# Patient Record
Sex: FEMALE | Race: WHITE | NOT HISPANIC OR LATINO | URBAN - METROPOLITAN AREA
[De-identification: names, ages, dates, MRNs, and addresses within clinical notes are randomized per-mention and may not be internally consistent; named-entity substitution may affect disease eponyms.]

---

## 2017-01-09 ENCOUNTER — EMERGENCY (EMERGENCY)
Facility: HOSPITAL | Age: 22
LOS: 1 days | Discharge: HOME | End: 2017-01-09
Attending: EMERGENCY MEDICINE | Admitting: EMERGENCY MEDICINE
Payer: COMMERCIAL

## 2017-01-09 VITALS
DIASTOLIC BLOOD PRESSURE: 65 MMHG | HEART RATE: 102 BPM | SYSTOLIC BLOOD PRESSURE: 118 MMHG | WEIGHT: 115.08 LBS | RESPIRATION RATE: 16 BRPM | OXYGEN SATURATION: 99 % | TEMPERATURE: 98 F

## 2017-01-09 DIAGNOSIS — F17.210 NICOTINE DEPENDENCE, CIGARETTES, UNCOMPLICATED: ICD-10-CM

## 2017-01-09 DIAGNOSIS — N83.201 UNSPECIFIED OVARIAN CYST, RIGHT SIDE: ICD-10-CM

## 2017-01-09 DIAGNOSIS — R10.30 LOWER ABDOMINAL PAIN, UNSPECIFIED: ICD-10-CM

## 2017-01-09 DIAGNOSIS — Z79.2 LONG TERM (CURRENT) USE OF ANTIBIOTICS: ICD-10-CM

## 2017-01-09 DIAGNOSIS — Z87.440 PERSONAL HISTORY OF URINARY (TRACT) INFECTIONS: ICD-10-CM

## 2017-01-09 LAB
ALBUMIN SERPL ELPH-MCNC: 4 G/DL — SIGNIFICANT CHANGE UP (ref 3.3–5)
ALP SERPL-CCNC: 57 U/L — SIGNIFICANT CHANGE UP (ref 40–120)
ALT FLD-CCNC: 21 U/L — SIGNIFICANT CHANGE UP (ref 12–78)
AMYLASE P1 CFR SERPL: 34 U/L — SIGNIFICANT CHANGE UP (ref 25–115)
ANION GAP SERPL CALC-SCNC: 7 MMOL/L — SIGNIFICANT CHANGE UP (ref 5–17)
APPEARANCE UR: CLEAR — SIGNIFICANT CHANGE UP
APTT BLD: 24.2 SEC — LOW (ref 27.5–37.4)
AST SERPL-CCNC: 30 U/L — SIGNIFICANT CHANGE UP (ref 15–37)
BASOPHILS # BLD AUTO: 0.1 K/UL — SIGNIFICANT CHANGE UP (ref 0–0.2)
BASOPHILS NFR BLD AUTO: 0.4 % — SIGNIFICANT CHANGE UP (ref 0–2)
BILIRUB SERPL-MCNC: 0.6 MG/DL — SIGNIFICANT CHANGE UP (ref 0.2–1.2)
BILIRUB UR-MCNC: NEGATIVE — SIGNIFICANT CHANGE UP
BUN SERPL-MCNC: 7 MG/DL — SIGNIFICANT CHANGE UP (ref 7–23)
CALCIUM SERPL-MCNC: 7.9 MG/DL — LOW (ref 8.5–10.1)
CHLORIDE SERPL-SCNC: 107 MMOL/L — SIGNIFICANT CHANGE UP (ref 96–108)
CO2 SERPL-SCNC: 28 MMOL/L — SIGNIFICANT CHANGE UP (ref 22–31)
COLOR SPEC: YELLOW — SIGNIFICANT CHANGE UP
CREAT SERPL-MCNC: 0.67 MG/DL — SIGNIFICANT CHANGE UP (ref 0.5–1.3)
DIFF PNL FLD: NEGATIVE — SIGNIFICANT CHANGE UP
EOSINOPHIL # BLD AUTO: 0 K/UL — SIGNIFICANT CHANGE UP (ref 0–0.5)
EOSINOPHIL NFR BLD AUTO: 0 % — SIGNIFICANT CHANGE UP (ref 0–6)
GLUCOSE SERPL-MCNC: 94 MG/DL — SIGNIFICANT CHANGE UP (ref 70–99)
GLUCOSE UR QL: NEGATIVE — SIGNIFICANT CHANGE UP
HCG SERPL-ACNC: <1 MIU/ML — SIGNIFICANT CHANGE UP
HCT VFR BLD CALC: 38.9 % — SIGNIFICANT CHANGE UP (ref 34.5–45)
HGB BLD-MCNC: 12.9 G/DL — SIGNIFICANT CHANGE UP (ref 11.5–15.5)
INR BLD: 1.02 RATIO — SIGNIFICANT CHANGE UP (ref 0.88–1.16)
KETONES UR-MCNC: NEGATIVE — SIGNIFICANT CHANGE UP
LEUKOCYTE ESTERASE UR-ACNC: NEGATIVE — SIGNIFICANT CHANGE UP
LIDOCAIN IGE QN: 94 U/L — SIGNIFICANT CHANGE UP (ref 73–393)
LYMPHOCYTES # BLD AUTO: 1.5 K/UL — SIGNIFICANT CHANGE UP (ref 1–3.3)
LYMPHOCYTES # BLD AUTO: 9.2 % — LOW (ref 13–44)
MCHC RBC-ENTMCNC: 30.8 PG — SIGNIFICANT CHANGE UP (ref 27–34)
MCHC RBC-ENTMCNC: 33.1 GM/DL — SIGNIFICANT CHANGE UP (ref 32–36)
MCV RBC AUTO: 93 FL — SIGNIFICANT CHANGE UP (ref 80–100)
MONOCYTES # BLD AUTO: 1.1 K/UL — HIGH (ref 0–0.9)
MONOCYTES NFR BLD AUTO: 6.8 % — SIGNIFICANT CHANGE UP (ref 1–9)
NEUTROPHILS # BLD AUTO: 13.3 K/UL — HIGH (ref 1.8–7.4)
NEUTROPHILS NFR BLD AUTO: 83.5 % — HIGH (ref 43–77)
NITRITE UR-MCNC: NEGATIVE — SIGNIFICANT CHANGE UP
PH UR: 6.5 — SIGNIFICANT CHANGE UP (ref 4.8–8)
PLATELET # BLD AUTO: 275 K/UL — SIGNIFICANT CHANGE UP (ref 150–400)
POTASSIUM SERPL-MCNC: 3.9 MMOL/L — SIGNIFICANT CHANGE UP (ref 3.5–5.3)
POTASSIUM SERPL-SCNC: 3.9 MMOL/L — SIGNIFICANT CHANGE UP (ref 3.5–5.3)
PROT SERPL-MCNC: 7.1 G/DL — SIGNIFICANT CHANGE UP (ref 6–8.3)
PROT UR-MCNC: NEGATIVE — SIGNIFICANT CHANGE UP
PROTHROM AB SERPL-ACNC: 11.3 SEC — SIGNIFICANT CHANGE UP (ref 10–13.1)
RBC # BLD: 4.19 M/UL — SIGNIFICANT CHANGE UP (ref 3.8–5.2)
RBC # FLD: 15.6 % — HIGH (ref 10.3–14.5)
SODIUM SERPL-SCNC: 142 MMOL/L — SIGNIFICANT CHANGE UP (ref 135–145)
SP GR SPEC: 1.01 — SIGNIFICANT CHANGE UP (ref 1.01–1.02)
UROBILINOGEN FLD QL: NEGATIVE — SIGNIFICANT CHANGE UP
WBC # BLD: 15.9 K/UL — HIGH (ref 3.8–10.5)
WBC # FLD AUTO: 15.9 K/UL — HIGH (ref 3.8–10.5)

## 2017-01-09 PROCEDURE — 99285 EMERGENCY DEPT VISIT HI MDM: CPT

## 2017-01-09 RX ORDER — MORPHINE SULFATE 50 MG/1
4 CAPSULE, EXTENDED RELEASE ORAL ONCE
Qty: 0 | Refills: 0 | Status: DISCONTINUED | OUTPATIENT
Start: 2017-01-09 | End: 2017-01-09

## 2017-01-09 RX ORDER — KETOROLAC TROMETHAMINE 30 MG/ML
30 SYRINGE (ML) INJECTION ONCE
Qty: 0 | Refills: 0 | Status: DISCONTINUED | OUTPATIENT
Start: 2017-01-09 | End: 2017-01-09

## 2017-01-09 RX ORDER — IOHEXOL 300 MG/ML
30 INJECTION, SOLUTION INTRAVENOUS ONCE
Qty: 0 | Refills: 0 | Status: DISCONTINUED | OUTPATIENT
Start: 2017-01-09 | End: 2017-01-13

## 2017-01-09 RX ORDER — ONDANSETRON 8 MG/1
4 TABLET, FILM COATED ORAL ONCE
Qty: 0 | Refills: 0 | Status: COMPLETED | OUTPATIENT
Start: 2017-01-09 | End: 2017-01-09

## 2017-01-09 RX ORDER — SODIUM CHLORIDE 9 MG/ML
3 INJECTION INTRAMUSCULAR; INTRAVENOUS; SUBCUTANEOUS ONCE
Qty: 0 | Refills: 0 | Status: COMPLETED | OUTPATIENT
Start: 2017-01-09 | End: 2017-01-09

## 2017-01-09 RX ORDER — SODIUM CHLORIDE 9 MG/ML
1000 INJECTION INTRAMUSCULAR; INTRAVENOUS; SUBCUTANEOUS
Qty: 0 | Refills: 0 | Status: DISCONTINUED | OUTPATIENT
Start: 2017-01-09 | End: 2017-01-13

## 2017-01-09 RX ADMIN — SODIUM CHLORIDE 1000 MILLILITER(S): 9 INJECTION INTRAMUSCULAR; INTRAVENOUS; SUBCUTANEOUS at 23:18

## 2017-01-09 RX ADMIN — Medication 30 MILLIGRAM(S): at 23:18

## 2017-01-09 RX ADMIN — SODIUM CHLORIDE 1000 MILLILITER(S): 9 INJECTION INTRAMUSCULAR; INTRAVENOUS; SUBCUTANEOUS at 21:54

## 2017-01-09 RX ADMIN — ONDANSETRON 4 MILLIGRAM(S): 8 TABLET, FILM COATED ORAL at 21:53

## 2017-01-09 RX ADMIN — MORPHINE SULFATE 4 MILLIGRAM(S): 50 CAPSULE, EXTENDED RELEASE ORAL at 21:53

## 2017-01-09 RX ADMIN — SODIUM CHLORIDE 3 MILLILITER(S): 9 INJECTION INTRAMUSCULAR; INTRAVENOUS; SUBCUTANEOUS at 21:53

## 2017-01-09 NOTE — ED PROVIDER NOTE - GASTROINTESTINAL, MLM
Abdomen soft, non-distended. no guarding. Pos diffuse lower abd tend, RLQ>LLQ, pos diffuse pelvic tend. No CVAT

## 2017-01-09 NOTE — ED PROVIDER NOTE - OBJECTIVE STATEMENT
20 yo F pw lower abd pain x past few hours. Mod pain. No n/v/d. Pt with recent diag of UTI, was on abx, last dose last night. No cp/sob/palp. no fever, slight chills tonight. No cp./sob/palp. no VB / vag dc. No neck / back pain. No hematuria. No agg/allev factors. No other inj or co. pt denies possibility of pregnancy.

## 2017-01-09 NOTE — ED PROVIDER NOTE - ENMT, MLM
Airway patent, Nasal mucosa clear. Mouth with normal mucosa. Throat has no vesicles, no oropharyngeal exudates and uvula is midline. MM Moist. Non-toxic, well appearing.

## 2017-01-09 NOTE — ED ADULT NURSE NOTE - OBJECTIVE STATEMENT
pt states she developed lower abdominal pain bilaterally at 2030. pt states she is nauseous but has not vomited or had diarrhea. pt in bed with mom at bedside crying in pain.

## 2017-01-09 NOTE — ED PROVIDER NOTE - CHPI ED SYMPTOMS NEG
no vomiting/no constipation/no hematuria/no decreased eating/drinking/no dysuria/no blood in stool/no abdominal distension/no weakness/no fever/no melena/no diarrhea

## 2017-01-10 PROCEDURE — 85730 THROMBOPLASTIN TIME PARTIAL: CPT

## 2017-01-10 PROCEDURE — 96374 THER/PROPH/DIAG INJ IV PUSH: CPT | Mod: 59

## 2017-01-10 PROCEDURE — 81003 URINALYSIS AUTO W/O SCOPE: CPT

## 2017-01-10 PROCEDURE — 99284 EMERGENCY DEPT VISIT MOD MDM: CPT | Mod: 25

## 2017-01-10 PROCEDURE — 74177 CT ABD & PELVIS W/CONTRAST: CPT | Mod: 26

## 2017-01-10 PROCEDURE — 83690 ASSAY OF LIPASE: CPT

## 2017-01-10 PROCEDURE — 76830 TRANSVAGINAL US NON-OB: CPT

## 2017-01-10 PROCEDURE — 76856 US EXAM PELVIC COMPLETE: CPT

## 2017-01-10 PROCEDURE — 76830 TRANSVAGINAL US NON-OB: CPT | Mod: 26

## 2017-01-10 PROCEDURE — 96375 TX/PRO/DX INJ NEW DRUG ADDON: CPT

## 2017-01-10 PROCEDURE — 80053 COMPREHEN METABOLIC PANEL: CPT

## 2017-01-10 PROCEDURE — 87086 URINE CULTURE/COLONY COUNT: CPT

## 2017-01-10 PROCEDURE — 84702 CHORIONIC GONADOTROPIN TEST: CPT

## 2017-01-10 PROCEDURE — 74177 CT ABD & PELVIS W/CONTRAST: CPT

## 2017-01-10 PROCEDURE — 76856 US EXAM PELVIC COMPLETE: CPT | Mod: 26

## 2017-01-10 PROCEDURE — 82150 ASSAY OF AMYLASE: CPT

## 2017-01-10 PROCEDURE — 85027 COMPLETE CBC AUTOMATED: CPT

## 2017-01-10 PROCEDURE — 85610 PROTHROMBIN TIME: CPT

## 2017-01-11 LAB
CULTURE RESULTS: NO GROWTH — SIGNIFICANT CHANGE UP
SPECIMEN SOURCE: SIGNIFICANT CHANGE UP

## 2017-02-15 ENCOUNTER — APPOINTMENT (OUTPATIENT)
Dept: OBGYN | Facility: CLINIC | Age: 22
End: 2017-02-15

## 2017-02-15 VITALS
SYSTOLIC BLOOD PRESSURE: 116 MMHG | WEIGHT: 110 LBS | HEIGHT: 62 IN | DIASTOLIC BLOOD PRESSURE: 75 MMHG | BODY MASS INDEX: 20.24 KG/M2

## 2017-02-15 DIAGNOSIS — Z30.09 ENCOUNTER FOR OTHER GENERAL COUNSELING AND ADVICE ON CONTRACEPTION: ICD-10-CM

## 2017-02-15 RX ORDER — ETONOGESTREL AND ETHINYL ESTRADIOL .12; .015 MG/D; MG/D
0.12-0.015 INSERT, EXTENDED RELEASE VAGINAL
Qty: 1 | Refills: 11 | Status: ACTIVE | COMMUNITY
Start: 2017-02-15 | End: 1900-01-01

## 2017-03-29 ENCOUNTER — APPOINTMENT (OUTPATIENT)
Dept: OBGYN | Facility: CLINIC | Age: 22
End: 2017-03-29

## 2017-10-02 ENCOUNTER — TRANSCRIPTION ENCOUNTER (OUTPATIENT)
Age: 22
End: 2017-10-02

## 2017-12-01 ENCOUNTER — EMERGENCY (EMERGENCY)
Facility: HOSPITAL | Age: 22
LOS: 1 days | Discharge: HOME | End: 2017-12-01
Attending: EMERGENCY MEDICINE | Admitting: EMERGENCY MEDICINE
Payer: COMMERCIAL

## 2017-12-01 VITALS
HEART RATE: 68 BPM | TEMPERATURE: 98 F | DIASTOLIC BLOOD PRESSURE: 84 MMHG | RESPIRATION RATE: 18 BRPM | OXYGEN SATURATION: 98 % | SYSTOLIC BLOOD PRESSURE: 118 MMHG

## 2017-12-01 VITALS
HEART RATE: 81 BPM | OXYGEN SATURATION: 100 % | SYSTOLIC BLOOD PRESSURE: 118 MMHG | DIASTOLIC BLOOD PRESSURE: 77 MMHG | RESPIRATION RATE: 16 BRPM

## 2017-12-01 PROCEDURE — 99282 EMERGENCY DEPT VISIT SF MDM: CPT

## 2017-12-01 PROCEDURE — 99284 EMERGENCY DEPT VISIT MOD MDM: CPT

## 2017-12-01 NOTE — ED PROVIDER NOTE - CHPI ED SYMPTOMS NEG
no pain/no tingling/no vomiting/no fever/no chills/no decreased eating/drinking/no weakness/no dizziness

## 2017-12-01 NOTE — ED PROVIDER NOTE - OBJECTIVE STATEMENT
22 year old female with hx of drug abuse/dependence other than that no significant pmhx presents with request to detox from opoid use. Patient sniffs and takes Xanax and opioids for about 5 years. Last time use was Wednesday. Patient states mild shakiness and headaches. Admits to smoking marijuana upon arrival to ED. Tobacco use and social ETOH use. 22 year old female with hx of drug abuse/dependence other than that no significant pmhx presents with request to detox from opoid use. Patient sniffs and takes Xanax and opioids for about 5 years. Last time use was Wednesday. Patient states mild shakiness and headaches. Admits to smoking marijuana upon arrival to ED. Tobacco use and social ETOH use. Denies any medical complaints. No vomiting, abdominal pain, palpitations

## 2017-12-01 NOTE — ED PROVIDER NOTE - PROGRESS NOTE DETAILS
Spoke with social work and cannot provide detox. However will be providing resources for OP rehab. Plan for DC Spoke with social work and cannot provide transfer for detox due to . However will be providing resources for OP rehab. Plan for DC Spoke with social work and cannot provide transfer for detox due to not actively withdrawing. However will be providing resources for OP rehab. Plan for DC

## 2017-12-01 NOTE — ED PROVIDER NOTE - NS_ ATTENDINGSCRIBEDETAILS _ED_A_ED_FT
Maddi Carlson MD - Attending Physician: The scribe's documentation has been prepared under my direction and personally reviewed by me in its entirety. I confirm that the note above accurately reflects all work, treatment, procedures, and medical decision making performed by me.

## 2017-12-01 NOTE — ED ADULT NURSE NOTE - OBJECTIVE STATEMENT
22y female pt, hx of drug abuse, arrived to ED requesting detox from opioids use. Pt states that she used Opioids including Xanax for about 5years and last use was this Wednesday night. Pt arrived with c/o of headache and mild shaking, follows commands, slow speech noted but coherent, calm and cooperative while talking. Pt also states that she smoked marijuana prior to arrival to ED and dose use tobacco. Denies SI/HI. No other complaints noted.

## 2017-12-02 PROBLEM — N39.0 URINARY TRACT INFECTION, SITE NOT SPECIFIED: Chronic | Status: ACTIVE | Noted: 2017-01-09

## 2018-09-21 ENCOUNTER — EMERGENCY (EMERGENCY)
Facility: HOSPITAL | Age: 23
LOS: 1 days | Discharge: HOME | End: 2018-09-21
Attending: EMERGENCY MEDICINE
Payer: COMMERCIAL

## 2018-09-21 VITALS
RESPIRATION RATE: 14 BRPM | HEART RATE: 112 BPM | HEIGHT: 60 IN | SYSTOLIC BLOOD PRESSURE: 95 MMHG | OXYGEN SATURATION: 98 % | TEMPERATURE: 99 F | DIASTOLIC BLOOD PRESSURE: 62 MMHG | WEIGHT: 139.99 LBS

## 2018-09-21 LAB
ALBUMIN SERPL ELPH-MCNC: 4.7 G/DL — SIGNIFICANT CHANGE UP (ref 3.3–5)
ALP SERPL-CCNC: 59 U/L — SIGNIFICANT CHANGE UP (ref 40–120)
ALT FLD-CCNC: 29 U/L — SIGNIFICANT CHANGE UP (ref 10–45)
ANION GAP SERPL CALC-SCNC: 15 MMOL/L — SIGNIFICANT CHANGE UP (ref 5–17)
AST SERPL-CCNC: 22 U/L — SIGNIFICANT CHANGE UP (ref 10–40)
BASOPHILS # BLD AUTO: 0 K/UL — SIGNIFICANT CHANGE UP (ref 0–0.2)
BASOPHILS NFR BLD AUTO: 0.3 % — SIGNIFICANT CHANGE UP (ref 0–2)
BILIRUB SERPL-MCNC: 0.6 MG/DL — SIGNIFICANT CHANGE UP (ref 0.2–1.2)
BUN SERPL-MCNC: 15 MG/DL — SIGNIFICANT CHANGE UP (ref 7–23)
CALCIUM SERPL-MCNC: 9.5 MG/DL — SIGNIFICANT CHANGE UP (ref 8.4–10.5)
CHLORIDE SERPL-SCNC: 102 MMOL/L — SIGNIFICANT CHANGE UP (ref 96–108)
CO2 SERPL-SCNC: 22 MMOL/L — SIGNIFICANT CHANGE UP (ref 22–31)
CREAT SERPL-MCNC: 0.98 MG/DL — SIGNIFICANT CHANGE UP (ref 0.5–1.3)
EOSINOPHIL # BLD AUTO: 0 K/UL — SIGNIFICANT CHANGE UP (ref 0–0.5)
EOSINOPHIL NFR BLD AUTO: 0.4 % — SIGNIFICANT CHANGE UP (ref 0–6)
GLUCOSE SERPL-MCNC: 88 MG/DL — SIGNIFICANT CHANGE UP (ref 70–99)
HCT VFR BLD CALC: 43.8 % — SIGNIFICANT CHANGE UP (ref 34.5–45)
HGB BLD-MCNC: 14.8 G/DL — SIGNIFICANT CHANGE UP (ref 11.5–15.5)
LYMPHOCYTES # BLD AUTO: 1.1 K/UL — SIGNIFICANT CHANGE UP (ref 1–3.3)
LYMPHOCYTES # BLD AUTO: 13.6 % — SIGNIFICANT CHANGE UP (ref 13–44)
MCHC RBC-ENTMCNC: 31.4 PG — SIGNIFICANT CHANGE UP (ref 27–34)
MCHC RBC-ENTMCNC: 33.8 GM/DL — SIGNIFICANT CHANGE UP (ref 32–36)
MCV RBC AUTO: 92.8 FL — SIGNIFICANT CHANGE UP (ref 80–100)
MONOCYTES # BLD AUTO: 0.4 K/UL — SIGNIFICANT CHANGE UP (ref 0–0.9)
MONOCYTES NFR BLD AUTO: 5.3 % — SIGNIFICANT CHANGE UP (ref 2–14)
NEUTROPHILS # BLD AUTO: 6.7 K/UL — SIGNIFICANT CHANGE UP (ref 1.8–7.4)
NEUTROPHILS NFR BLD AUTO: 80.3 % — HIGH (ref 43–77)
PLATELET # BLD AUTO: 329 K/UL — SIGNIFICANT CHANGE UP (ref 150–400)
POTASSIUM SERPL-MCNC: 4 MMOL/L — SIGNIFICANT CHANGE UP (ref 3.5–5.3)
POTASSIUM SERPL-SCNC: 4 MMOL/L — SIGNIFICANT CHANGE UP (ref 3.5–5.3)
PROT SERPL-MCNC: 7.7 G/DL — SIGNIFICANT CHANGE UP (ref 6–8.3)
RBC # BLD: 4.71 M/UL — SIGNIFICANT CHANGE UP (ref 3.8–5.2)
RBC # FLD: 14.2 % — SIGNIFICANT CHANGE UP (ref 10.3–14.5)
SODIUM SERPL-SCNC: 139 MMOL/L — SIGNIFICANT CHANGE UP (ref 135–145)
WBC # BLD: 8.3 K/UL — SIGNIFICANT CHANGE UP (ref 3.8–10.5)
WBC # FLD AUTO: 8.3 K/UL — SIGNIFICANT CHANGE UP (ref 3.8–10.5)

## 2018-09-21 PROCEDURE — 99284 EMERGENCY DEPT VISIT MOD MDM: CPT | Mod: 25

## 2018-09-21 PROCEDURE — 93010 ELECTROCARDIOGRAM REPORT: CPT | Mod: NC

## 2018-09-21 PROCEDURE — 90792 PSYCH DIAG EVAL W/MED SRVCS: CPT

## 2018-09-21 RX ORDER — SODIUM CHLORIDE 9 MG/ML
1000 INJECTION INTRAMUSCULAR; INTRAVENOUS; SUBCUTANEOUS ONCE
Qty: 0 | Refills: 0 | Status: COMPLETED | OUTPATIENT
Start: 2018-09-21 | End: 2018-09-21

## 2018-09-21 RX ADMIN — SODIUM CHLORIDE 1000 MILLILITER(S): 9 INJECTION INTRAMUSCULAR; INTRAVENOUS; SUBCUTANEOUS at 23:27

## 2018-09-21 NOTE — ED PROVIDER NOTE - SHIFT CHANGE DETAILS
***ATTENDING ADDENDUM (Dr. Dutch Saba): I have received handoff from [place]. s/p possible intentional over-administration of heroin v. therapeutic misadventure. Serial reassessments. Consider psychiatry consultation r/o suicidal ideation? Will continue to observe and monitor closely.

## 2018-09-21 NOTE — ED ADULT NURSE NOTE - NSIMPLEMENTINTERV_GEN_ALL_ED
Implemented All Fall Risk Interventions:  [place] to call system. Call bell, personal items and telephone within reach. Instruct patient to call for assistance. Room bathroom lighting operational. Non-slip footwear when patient is off stretcher. Physically safe environment: no spills, clutter or unnecessary equipment. Stretcher in lowest position, wheels locked, appropriate side rails in place. Provide visual cue, wrist band, yellow gown, etc. Monitor gait and stability. Monitor for mental status changes and reorient to person, place, and time. Review medications for side effects contributing to fall risk. Reinforce activity limits and safety measures with patient and family.

## 2018-09-21 NOTE — ED PROVIDER NOTE - PROGRESS NOTE DETAILS
**ATTENDING ADDENDUM (Dr. Dutch Saba): patient serially evaluated throughout ED course. NO acute deterioration up to this time in the ED. Awaiting completion of all ED diagnostics. Will continue to observe and monitor closely. Psychiatry to evaluate at patient's mother's request. Agree with goals/plan of ED care as described in EMR, including diagnostics, therapeutics and consultation as clinically warranted. per mom pt has been endorsing SI/HI and wants psych consult. psych consulted. **ATTENDING ADDENDUM (Dr. Dutch Saba): patient serially evaluated throughout ED course by ED team. NO acute deterioration up to this time in the ED. Agree with goals/plan of ED care as described in EMR, including diagnostics, therapeutics and consultation with psychiatry. Awaiting psychiatry consultation. In addition, prescription for naloxone nasal spray sent via e-prescribe to [facility]. HANDOFF made to [facility] at this time. (1) followup psychiatry consultation (2) perform serial reassessments (3) disposition pending at this time. Pyie: Pt's alert and awake. Psy cleared her for d/c. Pt's treated for UTI in ED. Mother is a SW and has a plan for outpatient detox.

## 2018-09-21 NOTE — ED PROVIDER NOTE - NS ED ROS FT
CONSTITUTIONAL: No fevers, no chills  Eyes: no visual changes  Ears:  no ear pain  Nose: no nasal congestion  Mouth/Throat: no sore throat  Cardiovascular: No Chest pain  Respiratory: No SOB  Gastrointestinal: No n/v/d, no abd pain  Genitourinary: no dysuria, no hematuria  SKIN: no rashes.  NEURO: no headache

## 2018-09-21 NOTE — ED PROVIDER NOTE - MEDICAL DECISION MAKING DETAILS
Attending Alex Bowser DO: 21 yo female hx of polysubstance abuse, "thrown out of rehab" 2 weeks ago for noncompliance with curfew, as per uncle has been injecting heroin since she left rehab Attending Alex Bowser DO: 23 yo female hx of polysubstance abuse, "thrown out of rehab" 2 weeks ago for noncompliance with curfew, as per uncle has been injecting heroin since she left rehab presents after co ingestion of heroin and xanax 1 hr ago. Unknown amount, but patient states "her usual amount". Unclear intent but concerns from family of possible SI attempt. PE: Ill appearing, sleepy, NAD, opens eyes to voice, lungs clear, RRR, no difficulty breathing, ab soft nt/nd, no rashes. A/P: Pt with likely intoxication from combination opiate and benzo. Pt protecting airway and with sufficient ventilation/oxygenation. Will check labs, tox screen, fluids, ekg, place on end tidal CO2 monitoring, will give family narcan for home, and observation until sober and then discuss with psych regarding possible SI attempt.
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
## 2018-09-21 NOTE — ED ADULT NURSE NOTE - OBJECTIVE STATEMENT
Pt presents to the ED with complaint of overdose, pt drowsy and lethargic, answers question with one word answers, pt uncle brought patient in to ED as pt was found at home lethargic and "speaking gibberish", per uncle pt was taking xanax and heroin throughout the day, pt speaking coherently on arrival, breathing unlabored and shallow, lung sounds clear, skins color pale for age and race, pt denies pain at this time, no nonverbal indicator of pain present, pt denies suicidal ideation,

## 2018-09-21 NOTE — ED PROVIDER NOTE - OBJECTIVE STATEMENT
22 YOF BIB uncle s/p heroin use and xanax use. Pt has hx of polysubstance abuse recently in rehab discharged 2 weeks ago. Pt has been using regularly since discharge. Pt states she had 5 pills of xanax today and injected heroin around 9pm. Pt feels slightly sleepy has no complaints. Per uncle, pt seemed slightly out of it and he found 2 used needles so he brought her into the emergency department. Pt denies any SI/HI.

## 2018-09-21 NOTE — ED PROVIDER NOTE - CARE PLAN
Principal Discharge DX:	Overdose Principal Discharge DX:	Benzodiazepine overdose of undetermined intent, initial encounter  Secondary Diagnosis:	Opioid dependence with uncomplicated intoxication

## 2018-09-21 NOTE — ED PROVIDER NOTE - CONSTITUTIONAL, MLM
normal... Well appearing, well nourished, awake, follows commands, oriented to person, place, time/situation and in no apparent distress.

## 2018-09-22 VITALS
HEART RATE: 76 BPM | RESPIRATION RATE: 16 BRPM | SYSTOLIC BLOOD PRESSURE: 105 MMHG | DIASTOLIC BLOOD PRESSURE: 65 MMHG | TEMPERATURE: 98 F

## 2018-09-22 DIAGNOSIS — F11.20 OPIOID DEPENDENCE, UNCOMPLICATED: ICD-10-CM

## 2018-09-22 PROBLEM — F19.20 OTHER PSYCHOACTIVE SUBSTANCE DEPENDENCE, UNCOMPLICATED: Chronic | Status: ACTIVE | Noted: 2017-12-01

## 2018-09-22 LAB
APAP SERPL-MCNC: <15 UG/ML — SIGNIFICANT CHANGE UP (ref 10–30)
APPEARANCE UR: ABNORMAL
BACTERIA # UR AUTO: ABNORMAL
BILIRUB UR-MCNC: NEGATIVE — SIGNIFICANT CHANGE UP
COLOR SPEC: YELLOW — SIGNIFICANT CHANGE UP
DIFF PNL FLD: NEGATIVE — SIGNIFICANT CHANGE UP
EPI CELLS # UR: 9 /HPF — HIGH
ETHANOL SERPL-MCNC: SIGNIFICANT CHANGE UP MG/DL (ref 0–10)
GLUCOSE UR QL: NEGATIVE — SIGNIFICANT CHANGE UP
HCG UR QL: NEGATIVE — SIGNIFICANT CHANGE UP
HYALINE CASTS # UR AUTO: 5 /LPF — HIGH (ref 0–2)
KETONES UR-MCNC: ABNORMAL
LEUKOCYTE ESTERASE UR-ACNC: ABNORMAL
NITRITE UR-MCNC: POSITIVE
PCP SPEC-MCNC: SIGNIFICANT CHANGE UP
PH UR: 6.5 — SIGNIFICANT CHANGE UP (ref 5–8)
PROT UR-MCNC: ABNORMAL
RBC CASTS # UR COMP ASSIST: 2 /HPF — SIGNIFICANT CHANGE UP (ref 0–4)
SALICYLATES SERPL-MCNC: <2 MG/DL — LOW (ref 15–30)
SP GR SPEC: 1.03 — SIGNIFICANT CHANGE UP
UROBILINOGEN FLD QL: NEGATIVE — SIGNIFICANT CHANGE UP
WBC UR QL: 66 /HPF — HIGH (ref 0–5)

## 2018-09-22 PROCEDURE — 87186 SC STD MICRODIL/AGAR DIL: CPT

## 2018-09-22 PROCEDURE — 85027 COMPLETE CBC AUTOMATED: CPT

## 2018-09-22 PROCEDURE — 80307 DRUG TEST PRSMV CHEM ANLYZR: CPT

## 2018-09-22 PROCEDURE — 81025 URINE PREGNANCY TEST: CPT

## 2018-09-22 PROCEDURE — 99285 EMERGENCY DEPT VISIT HI MDM: CPT | Mod: 25

## 2018-09-22 PROCEDURE — 93005 ELECTROCARDIOGRAM TRACING: CPT

## 2018-09-22 PROCEDURE — 87086 URINE CULTURE/COLONY COUNT: CPT

## 2018-09-22 PROCEDURE — 80053 COMPREHEN METABOLIC PANEL: CPT

## 2018-09-22 PROCEDURE — 81001 URINALYSIS AUTO W/SCOPE: CPT

## 2018-09-22 PROCEDURE — 96365 THER/PROPH/DIAG IV INF INIT: CPT

## 2018-09-22 RX ORDER — NALOXONE HYDROCHLORIDE 4 MG/.1ML
4 SPRAY NASAL
Qty: 4 | Refills: 0
Start: 2018-09-22 | End: 2018-09-22

## 2018-09-22 RX ORDER — CEPHALEXIN 500 MG
1 CAPSULE ORAL
Qty: 20 | Refills: 0
Start: 2018-09-22 | End: 2018-10-01

## 2018-09-22 RX ORDER — CEFTRIAXONE 500 MG/1
1 INJECTION, POWDER, FOR SOLUTION INTRAMUSCULAR; INTRAVENOUS ONCE
Qty: 0 | Refills: 0 | Status: COMPLETED | OUTPATIENT
Start: 2018-09-22 | End: 2018-09-22

## 2018-09-22 RX ORDER — SODIUM CHLORIDE 9 MG/ML
1000 INJECTION INTRAMUSCULAR; INTRAVENOUS; SUBCUTANEOUS ONCE
Qty: 0 | Refills: 0 | Status: COMPLETED | OUTPATIENT
Start: 2018-09-22 | End: 2018-09-22

## 2018-09-22 RX ADMIN — CEFTRIAXONE 1 GRAM(S): 500 INJECTION, POWDER, FOR SOLUTION INTRAMUSCULAR; INTRAVENOUS at 13:29

## 2018-09-22 RX ADMIN — SODIUM CHLORIDE 1000 MILLILITER(S): 9 INJECTION INTRAMUSCULAR; INTRAVENOUS; SUBCUTANEOUS at 09:00

## 2018-09-22 RX ADMIN — CEFTRIAXONE 100 GRAM(S): 500 INJECTION, POWDER, FOR SOLUTION INTRAMUSCULAR; INTRAVENOUS at 12:55

## 2018-09-22 RX ADMIN — SODIUM CHLORIDE 2000 MILLILITER(S): 9 INJECTION INTRAMUSCULAR; INTRAVENOUS; SUBCUTANEOUS at 08:30

## 2018-09-22 NOTE — ED BEHAVIORAL HEALTH ASSESSMENT NOTE - DIFFERENTIAL
Substance Use disorder  Substance induced MDD  Major Depressive Disorder  Generalized Anxiety Disorder  Borderline Personality Disorder  Bipolar Disorder with psychotic features Substance Use disorder  Substance induced mood disorder   Major Depressive Disorder  Generalized Anxiety Disorder  Borderline Personality Disorder  opiate dependence   Bipolar Disorder with psychotic features

## 2018-09-22 NOTE — ED ADULT NURSE REASSESSMENT NOTE - NS ED NURSE REASSESS COMMENT FT1
Per pt mother speaking with MD Garcia, pt has endorsed suicidal ideation in the past. Pt denied SI on arrival to ED. Pt placed on one to one observation for risk of harm to self. Pt lethargic and resting in stretcher with mother at bedside. Pt on continuous pulse oximetry and capnography. Pt provided food and soft drink, GOLD Schmitt spoke with mother regarding situation. Comfort and safety maintained.

## 2018-09-22 NOTE — ED ADULT NURSE REASSESSMENT NOTE - NS ED NURSE REASSESS COMMENT FT1
Pt lethargic and drowsy, answers questions with one word answers. Unable to maintain eye opening, mother at bedside, pt on continuous pulse oximeter and capnography.

## 2018-09-22 NOTE — ED BEHAVIORAL HEALTH ASSESSMENT NOTE - CASE SUMMARY
22 YOF BIB uncle, hx polysubtance abuse, mostly heroin, recent rehab admissions, no prior psych admissions, not in current psych tx, presented to ER for overdose of heroin, xanax, consulted for possible suicide attempt. Pt poor historian initially, was lethargic in the am, Pt now more alert. Pt denies si/hi. Pt denied si at the time of overdosing on heroin, repeatedly stated "I just want to get high". Pt usually takes 2-3 bag daily, injecting heroin. pt states she took only 1 xanax pill last night. pt denies depression, anxiety, psychosis, livia. Family concerned pt may have manic symptoms few weeks ago, pt has been more depressed, increasing amts of heroin recently. Pt has appt for [facility] next monday. pt doesn't warrant invol psych admission at this time. no 1:1 needed. pt overall low risk for suicide attempt. 22 YOF BIB uncle, hx polysubstance abuse, mostly heroin, recent rehab admissions, no prior psych admissions, not in current psych tx, presented to ER for overdose of heroin, xanax, consulted for possible suicide attempt. Pt poor historian initially, was lethargic in the am, Pt now more alert. Pt denies si/hi. Pt denied si at the time of overdosing on heroin, repeatedly stated "I just want to get high". Pt usually takes 2-3 bag daily, injecting heroin. pt states she took only 1 xanax pill last night. pt denies depression, anxiety, psychosis, livia. Family concerned pt may have manic symptoms few weeks ago, pt has been more depressed, increasing amts of heroin recently. Pt has appt for [facility] detox/[facility]hab next monday. pt doesn't warrant invol psych admission at this time. no 1:1 needed. pt overall low risk for suicide attempt.
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
## 2018-09-22 NOTE — ED BEHAVIORAL HEALTH ASSESSMENT NOTE - OTHER PAST PSYCHIATRIC HISTORY (INCLUDE DETAILS REGARDING ONSET, COURSE OF ILLNESS, INPATIENT/OUTPATIENT TREATMENT)
Substance use disorder for many years, periods of livia followed by depression and occasional psychosis. remote dx of depression and anxiety.
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
## 2018-09-22 NOTE — ED BEHAVIORAL HEALTH ASSESSMENT NOTE - RISK ASSESSMENT
Patient is an acute risk to herself. Although she denies active suicidality, she is certainly engaging in very risky behavior with a high likely pedro of serious life consequences including death. Patient has also stated to sister a few days ago, a passive wish for dying and this happened with an overdose being acceptable to her. The fact that currently she is hospitalized for an OD also puts her in an acute risk category. pt overall low risk for suicide attempt, no current si/hi, no hx attempts, future oriented, risk factors include substance abuse, hx cutting
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
## 2018-09-22 NOTE — CHART NOTE - NSCHARTNOTEFT_GEN_A_CORE
ED : Patient referred to SW via MD referral as patient presents to ED s/p overdose. As per H&P, “22 YOF BIB uncle s/p heroin use and xanax use. Pt has hx of polysubstance abuse recently in rehab discharged 2 weeks ago. Pt has been using regularly since discharge. Pt states she had 5 pills of xanax today and injected heroin around 9pm. Pt feels slightly sleepy has no complaints. Per uncle, pt seemed slightly out of it and he found 2 used needles so he brought her into the emergency department.” As per psychiatry, patient does not need inpatient psychiatric admission at this time. LMSW met with patient at bedside to explore substance abuse resources with patient. Patient very lethargic, and no family present at bedside. As per patient she has an appointment Monday morning for detox/inpatient at [facility] on Monday. Patient has been inpatient [facility] twice and has not had success. LMSW provided patient with alternate substance abuse resources to look into. Patient stated she will have her mother look over the list. As per psychiatry and medical team, patient medically stable for discharge home. Patient mother to assist patient home. LMSW assured ongoing role and availability. Social work to remain available as needed.
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
## 2018-09-22 NOTE — CONSULT NOTE ADULT - ASSESSMENT
Patient is a 22 year old female with an extensive psychiatric history. Given her long history of drug use, it is very difficult to gauge how much of the current psychiatric issues are substance induced and how much is baseline psychiatric ilness. Patient may benefit from impatient psychiatric admission for sobering up and then being evaluated for psychiatric illness. Although most likely, she will require a long-term rehab placement for extensive substance use disorder.

## 2018-09-22 NOTE — ED BEHAVIORAL HEALTH ASSESSMENT NOTE - SUICIDE RISK FACTORS
Mood episode/Access to means (pills, firearms, etc.)/Unable to engage in safety planning/History of abuse/trauma/Substance abuse/dependence

## 2018-09-22 NOTE — ED BEHAVIORAL HEALTH ASSESSMENT NOTE - SUMMARY
Patient is a 22 year old female with an extensive psychiatric history. Given her long history of drug use, it is very difficult to gauge how much of the current psychiatric issues are substance induced and how much is baseline psychiatric illness. Patient certainly has many chaotic life events that have happened and enough collateral information to show she is not able to navigate through life and deal with everyday life stressors. Conversation with patient when she is more awake is warranted. Patient is a 22 year old female with an extensive psychiatric history. Given her long history of drug use, it is very difficult to gauge how much of the current psychiatric issues are substance induced and how much is baseline psychiatric illness. Patient certainly has many chaotic life events that have happened and enough collateral information to show she is not able to navigate through life and deal with everyday life.

## 2018-09-22 NOTE — ED BEHAVIORAL HEALTH ASSESSMENT NOTE - OTHER
twin sister lives with boyfriend and family lethargic gets agitated and answers impulsively laying in bed

## 2018-12-06 ENCOUNTER — APPOINTMENT (OUTPATIENT)
Dept: OBGYN | Facility: CLINIC | Age: 23
End: 2018-12-06

## 2019-08-01 ENCOUNTER — RESULT REVIEW (OUTPATIENT)
Age: 24
End: 2019-08-01

## 2021-08-26 NOTE — ED ADULT NURSE NOTE - DISCHARGE DATE/TIME
01-Dec-2017 17:55 O-T Advancement Flap Text: The defect edges were debeveled with a #15 scalpel blade.  Given the location of the defect, shape of the defect and the proximity to free margins an O-T advancement flap was deemed most appropriate.  Using a sterile surgical marker, an appropriate advancement flap was drawn incorporating the defect and placing the expected incisions within the relaxed skin tension lines where possible.    The area thus outlined was incised deep to adipose tissue with a #15 scalpel blade.  The skin margins were undermined to an appropriate distance in all directions utilizing iris scissors.

## 2021-09-22 NOTE — ED PROVIDER NOTE - MEDICAL DECISION MAKING DETAILS
Yes they are wanting hospice now.   22 year old female wanting detox from opioids and benzo. Reports mild jitteriness. Medically well. Plan: will discuss with social work for dispo plan.

## 2021-10-02 ENCOUNTER — EMERGENCY (EMERGENCY)
Facility: HOSPITAL | Age: 26
LOS: 1 days | Discharge: HOME | End: 2021-10-02
Attending: EMERGENCY MEDICINE | Admitting: EMERGENCY MEDICINE
Payer: MEDICAID

## 2021-10-02 VITALS
HEART RATE: 78 BPM | OXYGEN SATURATION: 97 % | DIASTOLIC BLOOD PRESSURE: 57 MMHG | HEIGHT: 60 IN | RESPIRATION RATE: 18 BRPM | SYSTOLIC BLOOD PRESSURE: 98 MMHG | TEMPERATURE: 98 F

## 2021-10-02 PROCEDURE — 99283 EMERGENCY DEPT VISIT LOW MDM: CPT

## 2021-10-02 RX ORDER — METHADONE HYDROCHLORIDE 40 MG/1
35 TABLET ORAL ONCE
Refills: 0 | Status: DISCONTINUED | OUTPATIENT
Start: 2021-10-02 | End: 2021-10-02

## 2021-10-02 RX ADMIN — METHADONE HYDROCHLORIDE 35 MILLIGRAM(S): 40 TABLET ORAL at 11:38

## 2021-10-02 NOTE — ED ADULT TRIAGE NOTE - CHIEF COMPLAINT QUOTE
Pt states she's a new patient at the methadone clinic, didn't realize it was closed today, so she was told to come to ED to get her dose. Denies any complaints.

## 2021-10-02 NOTE — ED PROVIDER NOTE - NS ED ROS FT
GENERAL: No fever, chills  CARDIAC: no chest pain/pressure, SOB, lower extremity swelling  PULMONARY: no cough, SOB  GI: no abdominal pain, n/v/d  : no dysuria, no hematuria  SKIN: no rashes, no ecchymosis  NEURO: no headache, lightheadedness

## 2021-10-02 NOTE — ED PROVIDER NOTE - PHYSICAL EXAMINATION
GEN: Patient awake alert NAD.   HEENT: normocephalic, atraumatic.  CARDIAC: RRR, S1, S2, no murmur.   PULM: CTA B/L no wheeze, rhonchi, rales.   NEURO: A&Ox3,

## 2021-10-02 NOTE — ED PROVIDER NOTE - CLINICAL SUMMARY MEDICAL DECISION MAKING FREE TEXT BOX
24 yo F presenting for methadone dose. Pt is a pt of the [facility] Methadone clinic for the past two wks.  Dose confirmed to be 35mg. Will administer.

## 2021-10-02 NOTE — ED PROVIDER NOTE - ATTENDING CONTRIBUTION TO CARE
DR. NUÑEZ, ATTENDING MD-  I performed a face to face bedside interview with the patient regarding history of present illness, review of symptoms and past medical history. I completed an independent physical exam.  I have discussed the patient's plan of care with the resident.   Documentation as above in the note.    24 y/o female missed methadone dose today at [facility] clinic as she got the hours wrong.  No w/d sx.  I spoke with pharmacy here who confirmed dose is 35mg and typically given another dose to go home with for tomorrow, however, we do not give methadone to go.  Will give dose today, pt to return tomorrow for Sunday's dose.

## 2021-10-02 NOTE — ED PROVIDER NOTE - PATIENT PORTAL LINK FT
You can access the FollowMyHealth Patient Portal offered by [facility] by registering at the following website: [email or web address]. By joining [employer]’s FollowMyHealth portal, you will also be able to view your health information using other applications (apps) compatible with our system.

## 2021-10-02 NOTE — ED PROVIDER NOTE - OBJECTIVE STATEMENT
26 yo F with Hx of substance abuse is presenting to the ED for her methadone dose. Pt missed her dose today because the center was closed. Her dose is 35mg.  Pt is a pt of the [facility] methadone clinic for the past two weeks. Pt denies any complaints.

## 2021-10-03 ENCOUNTER — EMERGENCY (EMERGENCY)
Facility: HOSPITAL | Age: 26
LOS: 1 days | Discharge: HOME | End: 2021-10-03
Attending: EMERGENCY MEDICINE | Admitting: EMERGENCY MEDICINE
Payer: COMMERCIAL

## 2021-10-03 VITALS
RESPIRATION RATE: 18 BRPM | DIASTOLIC BLOOD PRESSURE: 99 MMHG | HEIGHT: 60 IN | HEART RATE: 72 BPM | SYSTOLIC BLOOD PRESSURE: 118 MMHG | TEMPERATURE: 98 F | OXYGEN SATURATION: 99 %

## 2021-10-03 PROCEDURE — 99283 EMERGENCY DEPT VISIT LOW MDM: CPT

## 2021-10-03 RX ORDER — METHADONE HYDROCHLORIDE 40 MG/1
35 TABLET ORAL ONCE
Refills: 0 | Status: DISCONTINUED | OUTPATIENT
Start: 2021-10-03 | End: 2021-10-03

## 2021-10-03 RX ADMIN — METHADONE HYDROCHLORIDE 35 MILLIGRAM(S): 40 TABLET ORAL at 13:23

## 2021-10-03 NOTE — ED PROVIDER NOTE - PATIENT PORTAL LINK FT
You can access the FollowMyHealth Patient Portal offered by [facility] by registering at the following website: [email or web address]. By joining [employer]’s FollowMyHealth portal, you will also be able to view your health information using other applications (apps) compatible with our system.

## 2021-10-03 NOTE — ED PROVIDER NOTE - CLINICAL SUMMARY MEDICAL DECISION MAKING FREE TEXT BOX
24 y/o female on methadone therapy here for usual dose.  I saw the pt yesterday and confirmed dose with pharmacy at that time.  Will order methadone, dc with clinic f/u.

## 2021-10-03 NOTE — ED PROVIDER NOTE - OBJECTIVE STATEMENT
26 y/o female on methadone here for methadone dose.  Pt follows at [facility] methadone clinic.  Missed her appt yesterday and came to the ED yesterday and received usual 35 mg dose confirmed by pharmacy here.  She usually goes home with a dose on Saturdays as the clinic is closed Sundays.  Per pharmacy, cannot give dose to go home and advised she return Sunday for dose which she has done.  Feels at us[facility], no complaints.

## 2022-04-20 ENCOUNTER — INPATIENT (INPATIENT)
Facility: HOSPITAL | Age: 27
LOS: 0 days | Discharge: HOME | End: 2022-04-21
Attending: STUDENT IN AN ORGANIZED HEALTH CARE EDUCATION/TRAINING PROGRAM | Admitting: STUDENT IN AN ORGANIZED HEALTH CARE EDUCATION/TRAINING PROGRAM
Payer: MEDICAID

## 2022-04-20 VITALS
SYSTOLIC BLOOD PRESSURE: 113 MMHG | TEMPERATURE: 98 F | OXYGEN SATURATION: 98 % | RESPIRATION RATE: 18 BRPM | DIASTOLIC BLOOD PRESSURE: 77 MMHG | HEART RATE: 84 BPM | HEIGHT: 60 IN

## 2022-04-20 DIAGNOSIS — R10.9 UNSPECIFIED ABDOMINAL PAIN: ICD-10-CM

## 2022-04-20 DIAGNOSIS — Z29.9 ENCOUNTER FOR PROPHYLACTIC MEASURES, UNSPECIFIED: ICD-10-CM

## 2022-04-20 DIAGNOSIS — Z87.898 PERSONAL HISTORY OF OTHER SPECIFIED CONDITIONS: ICD-10-CM

## 2022-04-20 DIAGNOSIS — R11.10 VOMITING, UNSPECIFIED: ICD-10-CM

## 2022-04-20 DIAGNOSIS — F11.20 OPIOID DEPENDENCE, UNCOMPLICATED: ICD-10-CM

## 2022-04-20 DIAGNOSIS — F17.200 NICOTINE DEPENDENCE, UNSPECIFIED, UNCOMPLICATED: ICD-10-CM

## 2022-04-20 DIAGNOSIS — Z91.89 OTHER SPECIFIED PERSONAL RISK FACTORS, NOT ELSEWHERE CLASSIFIED: ICD-10-CM

## 2022-04-20 DIAGNOSIS — N30.90 CYSTITIS, UNSPECIFIED WITHOUT HEMATURIA: ICD-10-CM

## 2022-04-20 DIAGNOSIS — F12.188 CANNABIS ABUSE WITH OTHER CANNABIS-INDUCED DISORDER: ICD-10-CM

## 2022-04-20 DIAGNOSIS — F19.90 OTHER PSYCHOACTIVE SUBSTANCE USE, UNSPECIFIED, UNCOMPLICATED: ICD-10-CM

## 2022-04-20 LAB
ALBUMIN SERPL ELPH-MCNC: 4.8 G/DL — SIGNIFICANT CHANGE UP (ref 3.3–5)
ALP SERPL-CCNC: 60 U/L — SIGNIFICANT CHANGE UP (ref 40–120)
ALT FLD-CCNC: 16 U/L — SIGNIFICANT CHANGE UP (ref 4–33)
ANION GAP SERPL CALC-SCNC: 19 MMOL/L — HIGH (ref 7–14)
APPEARANCE UR: CLEAR — SIGNIFICANT CHANGE UP
AST SERPL-CCNC: 27 U/L — SIGNIFICANT CHANGE UP (ref 4–32)
BASOPHILS # BLD AUTO: 0.02 K/UL — SIGNIFICANT CHANGE UP (ref 0–0.2)
BASOPHILS NFR BLD AUTO: 0.2 % — SIGNIFICANT CHANGE UP (ref 0–2)
BILIRUB SERPL-MCNC: 0.4 MG/DL — SIGNIFICANT CHANGE UP (ref 0.2–1.2)
BILIRUB UR-MCNC: NEGATIVE — SIGNIFICANT CHANGE UP
BLOOD GAS VENOUS COMPREHENSIVE RESULT: SIGNIFICANT CHANGE UP
BUN SERPL-MCNC: 14 MG/DL — SIGNIFICANT CHANGE UP (ref 7–23)
CALCIUM SERPL-MCNC: 9.2 MG/DL — SIGNIFICANT CHANGE UP (ref 8.4–10.5)
CHLORIDE SERPL-SCNC: 100 MMOL/L — SIGNIFICANT CHANGE UP (ref 98–107)
CO2 SERPL-SCNC: 17 MMOL/L — LOW (ref 22–31)
COLOR SPEC: COLORLESS — SIGNIFICANT CHANGE UP
CREAT SERPL-MCNC: 0.74 MG/DL — SIGNIFICANT CHANGE UP (ref 0.5–1.3)
DIFF PNL FLD: NEGATIVE — SIGNIFICANT CHANGE UP
EGFR: 114 ML/MIN/1.73M2 — SIGNIFICANT CHANGE UP
EOSINOPHIL # BLD AUTO: 0.05 K/UL — SIGNIFICANT CHANGE UP (ref 0–0.5)
EOSINOPHIL NFR BLD AUTO: 0.5 % — SIGNIFICANT CHANGE UP (ref 0–6)
GLUCOSE SERPL-MCNC: 103 MG/DL — HIGH (ref 70–99)
GLUCOSE UR QL: NEGATIVE — SIGNIFICANT CHANGE UP
HCG SERPL-ACNC: <5 MIU/ML — SIGNIFICANT CHANGE UP
HCT VFR BLD CALC: 39.6 % — SIGNIFICANT CHANGE UP (ref 34.5–45)
HGB BLD-MCNC: 12.7 G/DL — SIGNIFICANT CHANGE UP (ref 11.5–15.5)
IANC: 8.03 K/UL — HIGH (ref 1.8–7.4)
IMM GRANULOCYTES NFR BLD AUTO: 0.3 % — SIGNIFICANT CHANGE UP (ref 0–1.5)
KETONES UR-MCNC: NEGATIVE — SIGNIFICANT CHANGE UP
LACTATE SERPL-SCNC: 1.2 MMOL/L — SIGNIFICANT CHANGE UP (ref 0.5–2)
LEUKOCYTE ESTERASE UR-ACNC: NEGATIVE — SIGNIFICANT CHANGE UP
LIDOCAIN IGE QN: 35 U/L — SIGNIFICANT CHANGE UP (ref 7–60)
LYMPHOCYTES # BLD AUTO: 2.2 K/UL — SIGNIFICANT CHANGE UP (ref 1–3.3)
LYMPHOCYTES # BLD AUTO: 20 % — SIGNIFICANT CHANGE UP (ref 13–44)
MCHC RBC-ENTMCNC: 30.6 PG — SIGNIFICANT CHANGE UP (ref 27–34)
MCHC RBC-ENTMCNC: 32.1 GM/DL — SIGNIFICANT CHANGE UP (ref 32–36)
MCV RBC AUTO: 95.4 FL — SIGNIFICANT CHANGE UP (ref 80–100)
MONOCYTES # BLD AUTO: 0.69 K/UL — SIGNIFICANT CHANGE UP (ref 0–0.9)
MONOCYTES NFR BLD AUTO: 6.3 % — SIGNIFICANT CHANGE UP (ref 2–14)
NEUTROPHILS # BLD AUTO: 8.03 K/UL — HIGH (ref 1.8–7.4)
NEUTROPHILS NFR BLD AUTO: 72.7 % — SIGNIFICANT CHANGE UP (ref 43–77)
NITRITE UR-MCNC: NEGATIVE — SIGNIFICANT CHANGE UP
NRBC # BLD: 0 /100 WBCS — SIGNIFICANT CHANGE UP
NRBC # FLD: 0 K/UL — SIGNIFICANT CHANGE UP
PH UR: 8.5 — HIGH (ref 5–8)
PLATELET # BLD AUTO: 270 K/UL — SIGNIFICANT CHANGE UP (ref 150–400)
POTASSIUM SERPL-MCNC: 4.8 MMOL/L — SIGNIFICANT CHANGE UP (ref 3.5–5.3)
POTASSIUM SERPL-SCNC: 4.8 MMOL/L — SIGNIFICANT CHANGE UP (ref 3.5–5.3)
PROT SERPL-MCNC: 7.4 G/DL — SIGNIFICANT CHANGE UP (ref 6–8.3)
PROT UR-MCNC: NEGATIVE — SIGNIFICANT CHANGE UP
RBC # BLD: 4.15 M/UL — SIGNIFICANT CHANGE UP (ref 3.8–5.2)
RBC # FLD: 16.4 % — HIGH (ref 10.3–14.5)
SARS-COV-2 RNA SPEC QL NAA+PROBE: SIGNIFICANT CHANGE UP
SODIUM SERPL-SCNC: 136 MMOL/L — SIGNIFICANT CHANGE UP (ref 135–145)
SP GR SPEC: 1.01 — SIGNIFICANT CHANGE UP (ref 1–1.05)
UROBILINOGEN FLD QL: SIGNIFICANT CHANGE UP
WBC # BLD: 11.02 K/UL — HIGH (ref 3.8–10.5)
WBC # FLD AUTO: 11.02 K/UL — HIGH (ref 3.8–10.5)

## 2022-04-20 PROCEDURE — 99252 IP/OBS CONSLTJ NEW/EST SF 35: CPT | Mod: GC

## 2022-04-20 PROCEDURE — 99285 EMERGENCY DEPT VISIT HI MDM: CPT

## 2022-04-20 PROCEDURE — 99231 SBSQ HOSP IP/OBS SF/LOW 25: CPT | Mod: GC

## 2022-04-20 PROCEDURE — 99233 SBSQ HOSP IP/OBS HIGH 50: CPT | Mod: GC

## 2022-04-20 PROCEDURE — 74177 CT ABD & PELVIS W/CONTRAST: CPT | Mod: 26,MA

## 2022-04-20 PROCEDURE — 99223 1ST HOSP IP/OBS HIGH 75: CPT | Mod: GC

## 2022-04-20 PROCEDURE — 99222 1ST HOSP IP/OBS MODERATE 55: CPT | Mod: GC

## 2022-04-20 RX ORDER — PANTOPRAZOLE SODIUM 20 MG/1
40 TABLET, DELAYED RELEASE ORAL
Refills: 0 | Status: DISCONTINUED | OUTPATIENT
Start: 2022-04-20 | End: 2022-04-21

## 2022-04-20 RX ORDER — ONDANSETRON 8 MG/1
4 TABLET, FILM COATED ORAL ONCE
Refills: 0 | Status: COMPLETED | OUTPATIENT
Start: 2022-04-20 | End: 2022-04-20

## 2022-04-20 RX ORDER — CITALOPRAM 10 MG/1
0 TABLET, FILM COATED ORAL
Qty: 0 | Refills: 0 | DISCHARGE

## 2022-04-20 RX ORDER — CITALOPRAM 10 MG/1
40 TABLET, FILM COATED ORAL DAILY
Refills: 0 | Status: DISCONTINUED | OUTPATIENT
Start: 2022-04-20 | End: 2022-04-21

## 2022-04-20 RX ORDER — METHADONE HYDROCHLORIDE 40 MG/1
40 TABLET ORAL ONCE
Refills: 0 | Status: DISCONTINUED | OUTPATIENT
Start: 2022-04-20 | End: 2022-04-20

## 2022-04-20 RX ORDER — ACETAMINOPHEN 500 MG
1000 TABLET ORAL ONCE
Refills: 0 | Status: COMPLETED | OUTPATIENT
Start: 2022-04-20 | End: 2022-04-20

## 2022-04-20 RX ORDER — QUETIAPINE FUMARATE 200 MG/1
0 TABLET, FILM COATED ORAL
Qty: 0 | Refills: 0 | DISCHARGE

## 2022-04-20 RX ORDER — SODIUM CHLORIDE 9 MG/ML
1000 INJECTION INTRAMUSCULAR; INTRAVENOUS; SUBCUTANEOUS ONCE
Refills: 0 | Status: COMPLETED | OUTPATIENT
Start: 2022-04-20 | End: 2022-04-20

## 2022-04-20 RX ORDER — HYDROXYZINE HCL 10 MG
50 TABLET ORAL AT BEDTIME
Refills: 0 | Status: DISCONTINUED | OUTPATIENT
Start: 2022-04-20 | End: 2022-04-21

## 2022-04-20 RX ORDER — CITALOPRAM 10 MG/1
1 TABLET, FILM COATED ORAL
Qty: 0 | Refills: 0 | DISCHARGE

## 2022-04-20 RX ORDER — PRAZOSIN HCL 2 MG
1 CAPSULE ORAL
Qty: 0 | Refills: 0 | DISCHARGE

## 2022-04-20 RX ORDER — METOCLOPRAMIDE HCL 10 MG
10 TABLET ORAL ONCE
Refills: 0 | Status: COMPLETED | OUTPATIENT
Start: 2022-04-20 | End: 2022-04-20

## 2022-04-20 RX ORDER — QUETIAPINE FUMARATE 200 MG/1
50 TABLET, FILM COATED ORAL AT BEDTIME
Refills: 0 | Status: DISCONTINUED | OUTPATIENT
Start: 2022-04-20 | End: 2022-04-21

## 2022-04-20 RX ORDER — NICOTINE POLACRILEX 2 MG
1 GUM BUCCAL DAILY
Refills: 0 | Status: DISCONTINUED | OUTPATIENT
Start: 2022-04-20 | End: 2022-04-21

## 2022-04-20 RX ORDER — QUETIAPINE FUMARATE 200 MG/1
1 TABLET, FILM COATED ORAL
Qty: 0 | Refills: 0 | DISCHARGE

## 2022-04-20 RX ORDER — FAMOTIDINE 10 MG/ML
20 INJECTION INTRAVENOUS ONCE
Refills: 0 | Status: COMPLETED | OUTPATIENT
Start: 2022-04-20 | End: 2022-04-20

## 2022-04-20 RX ORDER — MORPHINE SULFATE 50 MG/1
4 CAPSULE, EXTENDED RELEASE ORAL ONCE
Refills: 0 | Status: DISCONTINUED | OUTPATIENT
Start: 2022-04-20 | End: 2022-04-20

## 2022-04-20 RX ORDER — PHENAZOPYRIDINE HCL 100 MG
100 TABLET ORAL EVERY 8 HOURS
Refills: 0 | Status: DISCONTINUED | OUTPATIENT
Start: 2022-04-20 | End: 2022-04-21

## 2022-04-20 RX ORDER — METHADONE HYDROCHLORIDE 40 MG/1
85 TABLET ORAL DAILY
Refills: 0 | Status: DISCONTINUED | OUTPATIENT
Start: 2022-04-21 | End: 2022-04-21

## 2022-04-20 RX ORDER — HYDROXYZINE HCL 10 MG
1 TABLET ORAL
Qty: 0 | Refills: 0 | DISCHARGE

## 2022-04-20 RX ORDER — MAGNESIUM SULFATE 500 MG/ML
2 VIAL (ML) INJECTION ONCE
Refills: 0 | Status: COMPLETED | OUTPATIENT
Start: 2022-04-20 | End: 2022-04-20

## 2022-04-20 RX ORDER — DOXAZOSIN MESYLATE 4 MG
2 TABLET ORAL AT BEDTIME
Refills: 0 | Status: DISCONTINUED | OUTPATIENT
Start: 2022-04-20 | End: 2022-04-21

## 2022-04-20 RX ORDER — PRAZOSIN HCL 2 MG
0 CAPSULE ORAL
Qty: 0 | Refills: 0 | DISCHARGE

## 2022-04-20 RX ADMIN — QUETIAPINE FUMARATE 50 MILLIGRAM(S): 200 TABLET, FILM COATED ORAL at 22:31

## 2022-04-20 RX ADMIN — ONDANSETRON 4 MILLIGRAM(S): 8 TABLET, FILM COATED ORAL at 08:22

## 2022-04-20 RX ADMIN — PANTOPRAZOLE SODIUM 40 MILLIGRAM(S): 20 TABLET, DELAYED RELEASE ORAL at 18:27

## 2022-04-20 RX ADMIN — METHADONE HYDROCHLORIDE 40 MILLIGRAM(S): 40 TABLET ORAL at 19:53

## 2022-04-20 RX ADMIN — SODIUM CHLORIDE 1000 MILLILITER(S): 9 INJECTION INTRAMUSCULAR; INTRAVENOUS; SUBCUTANEOUS at 10:57

## 2022-04-20 RX ADMIN — Medication 10 MILLIGRAM(S): at 08:46

## 2022-04-20 RX ADMIN — Medication 2 MILLIGRAM(S): at 08:46

## 2022-04-20 RX ADMIN — Medication 1 PATCH: at 18:28

## 2022-04-20 RX ADMIN — CITALOPRAM 40 MILLIGRAM(S): 10 TABLET, FILM COATED ORAL at 22:31

## 2022-04-20 RX ADMIN — Medication 1 PATCH: at 19:34

## 2022-04-20 RX ADMIN — Medication 25 GRAM(S): at 14:37

## 2022-04-20 RX ADMIN — MORPHINE SULFATE 4 MILLIGRAM(S): 50 CAPSULE, EXTENDED RELEASE ORAL at 08:22

## 2022-04-20 RX ADMIN — Medication 100 MILLIGRAM(S): at 22:31

## 2022-04-20 RX ADMIN — Medication 1000 MILLIGRAM(S): at 11:07

## 2022-04-20 RX ADMIN — Medication 2 MILLIGRAM(S): at 22:30

## 2022-04-20 RX ADMIN — Medication 400 MILLIGRAM(S): at 10:52

## 2022-04-20 RX ADMIN — MORPHINE SULFATE 4 MILLIGRAM(S): 50 CAPSULE, EXTENDED RELEASE ORAL at 08:37

## 2022-04-20 RX ADMIN — Medication 1000 MILLIGRAM(S): at 11:52

## 2022-04-20 RX ADMIN — SODIUM CHLORIDE 1000 MILLILITER(S): 9 INJECTION INTRAMUSCULAR; INTRAVENOUS; SUBCUTANEOUS at 09:06

## 2022-04-20 RX ADMIN — SODIUM CHLORIDE 1000 MILLILITER(S): 9 INJECTION INTRAMUSCULAR; INTRAVENOUS; SUBCUTANEOUS at 08:22

## 2022-04-20 RX ADMIN — FAMOTIDINE 20 MILLIGRAM(S): 10 INJECTION INTRAVENOUS at 08:22

## 2022-04-20 NOTE — ED PROVIDER NOTE - CLINICAL SUMMARY MEDICAL DECISION MAKING FREE TEXT BOX
25 Y/O F H/O Peptic ulcer disease, Heroin, marijuana and benzodiazapine use currently on methadone presents with acute nausea and epigastric pain which began at 5AM. Pt reported to [facility] today and received her methadone but subsequently vomited. Plan is anti-emetics, pain control, IV hydration, Ct to eval for acute abdominal pathology, labs to eval for pancreatitis, anemia or electrolyte disturbance, reassessment.

## 2022-04-20 NOTE — H&P ADULT - ATTENDING COMMENTS
26 y.o. F with hx of PUD, anxiety, polysubstance abuse (heroin, marijuana, benzodiazepines) currently on methadone who presents with N/V and lower abdominal pain. Pt was unable to take PO yesterday, but tolerated solids in the ED.   Also, pt had a BM in the ED. She complains about not getting her methadone.     When I evaluated pt, she was sleeping comfortably. Upon waking up, she immediately complains of pain.     #Emesis and abdominal pain:  -NBNB  -ddx includes gastritis, viral enteritis or cannabinoid hyperemesis syndrome  -On chart review, there is an admission at OSH for very similar N/V symptoms in 2020; ;this repetitive pattern of admissions makes me suspect cannabinoid hyperemesis more  -now tolerating PO, will start regular diet  -senna/miralax in the setting of abdominal pain largely suspected to be in the setting of moderate stool burden on CT, likely in the setting of opioid use    #Cystitis:   -patient has radiographic evidence of cystitis, but denies symptoms  -states she had a UTI a month ago that has since been treated  -no indication for abx at this time

## 2022-04-20 NOTE — H&P ADULT - PROBLEM SELECTOR PLAN 3
- nicotine patch 85 mg PO methadone daily per patient    Plan  - f/up with methadone clinic for dosing 85 mg PO methadone daily per patient  CTA/P showed stool burden in rectum    Plan  - f/up with methadone clinic for dosing  - miralax and senna daily to help with bowel movements

## 2022-04-20 NOTE — H&P ADULT - PROBLEM SELECTOR PLAN 5
DVT ppx w/ ambulation  GI ppx w/ pantoprazole  Regular diet Cystitis seen on CT, patient has history of UTI tx w/ nitrofurantoin one month ago     Plan:  - no need to treat at this time  - CTM

## 2022-04-20 NOTE — ED ADULT NURSE NOTE - CHIEF COMPLAINT QUOTE
Pt BIBEMS from [facility] Methadone clinic for abd pain and nausea/vomiting this am. States vomited 7 times. Dry heaving in triage. Pmhx: Drug abuse, gastritis, ulcers

## 2022-04-20 NOTE — ED PROVIDER NOTE - NS ED ATTENDING STATEMENT MOD
This was a shared visit with the BOBO. I reviewed and verified the documentation and independently performed the documented:

## 2022-04-20 NOTE — ED PROVIDER NOTE - ATTENDING APP SHARED VISIT CONTRIBUTION OF CARE
Mendoza: I have seen and examined the patient face to face, have reviewed and addended the HPI, PE and a/p as necessary.     25 Y/O F H/O Peptic ulcer disease, Heroin, marijuana and benzodiazapine use currently on methadone presents with acute nausea and epigastric pain which began at 5AM. Pt reported to [facility] today and received her methadone but subsequently vomited. States she had an endoscopy at an OSH years ago and was admitted for 4 days for peptic ulcer disease. Pt states the pain is currently 9/10. Pt denies any other sx or acute complaints.    25 yo F with peptic ulcer disease, heroin, marijuana and benzodiazepine use, currently on methadone a/w acute nausea and epigastric pain.  Pain started this AM, patient went to [facility] for methadone and continued to have nausea and vomiting.  Pt actively wretching upon arrival and during evaluation.  Reports pain is 9/10.  No fever/chills, No photophobia/eye pain/changes in vision, No ear pain/sore throat/dysphagia, No chest pain/palpitations, no SOB/cough/wheeze/stridor, no dysuria/frequency/discharge, No neck/back pain, no rash, no changes in neurological status/function.     GEN - uncomofrtable appearing actively vomiting,   CARD -s1s2, RRR, no M,G,R;   PULM - CTA b/l, symmetric breath sounds;   ABD -  +BS, diffusely tender to palpation, soft, no guarding, no rebound, no masses;   BACK - no CVA tenderness, Normal  spine;   EXT - symmetric pulses, 2+ dp, capillary refill < 2 seconds, no cyanosis, no edema;   NEURO - no focal neuro deficits, no slurred speech    Possibly cyclical vomiting syndrome given daily marijuana use, vs possible intraabdominal pathology given diffuse tenderness and acute pain,  r/o free air or perf viscous given history of peptic ulcer disease. - check cbc, cmp, symptomatic care with morphine, zofran, if not tolerating may require reglan and or ativan.  May require escalating to haldol for symptomatic relief for persistent  vomiting. Mendoza: I have seen and examined the patient face to face, have reviewed and addended the HPI, PE and a/p as necessary.     25 yo F with peptic ulcer disease, heroin, marijuana and benzodiazepine use, currently on methadone a/w acute nausea and epigastric pain.  Pain started this AM, patient went to [facility] for methadone and continued to have nausea and vomiting.  Pt actively wretching upon arrival and during evaluation.  Reports pain is 9/10.  No fever/chills, No photophobia/eye pain/changes in vision, No ear pain/sore throat/dysphagia, No chest pain/palpitations, no SOB/cough/wheeze/stridor, no dysuria/frequency/discharge, No neck/back pain, no rash, no changes in neurological status/function.     GEN - uncomofrtable appearing actively vomiting,   CARD -s1s2, RRR, no M,G,R;   PULM - CTA b/l, symmetric breath sounds;   ABD -  +BS, diffusely tender to palpation, soft, no guarding, no rebound, no masses;   BACK - no CVA tenderness, Normal  spine;   EXT - symmetric pulses, 2+ dp, capillary refill < 2 seconds, no cyanosis, no edema;   NEURO - no focal neuro deficits, no slurred speech    Possibly cyclical vomiting syndrome given daily marijuana use, vs possible intraabdominal pathology given diffuse tenderness and acute pain,  r/o free air or perf viscous given history of peptic ulcer disease. - check cbc, cmp, symptomatic care with morphine, zofran, if not tolerating may require reglan and or ativan.  May require escalating to haldol for symptomatic relief for persistent  vomiting.

## 2022-04-20 NOTE — H&P ADULT - NSHPPHYSICALEXAM_GEN_ALL_CORE
GENERAL APPEARANCE: Well developed, NAD  HEENT:  PERRL, EOMI. hearing grossly intact.  NECK: Neck supple, non-tender no lymphadenopathy, masses or thyromegaly.  CARDIAC: Normal S1 and S2. no mrg. RRR  LUNGS: Clear to auscultation B/L, no rales, rhonchi, or wheezing  ABDOMEN: Soft , NTND, bowel sounds normal. No guarding or rebound.   MUSCULOSKELETAL: ROM intact.  No joint erythema or tenderness.   EXTREMITIES: No edema. Peripheral pulses intact.   NEUROLOGICAL: Non focal. Strength and sensation symmetric and intact throughout.   SKIN: Warm and dry , Well perfused  PSYCHIATRIC: AOx3 , Normal mood and affect GENERAL APPEARANCE: Well developed, NAD  HEENT:  PERRL, EOMI. hearing grossly intact.  NECK: Neck supple, non-tender no lymphadenopathy, masses or thyromegaly.  CARDIAC: Normal S1 and S2. no mrg. RRR  LUNGS: Clear to auscultation B/L, no rales, rhonchi, or wheezing  ABDOMEN: Soft, non-distended, guarding on LLQ palpation, suprapubic tenderness, epigastric tenderness  MUSCULOSKELETAL: ROM intact.  No joint erythema or tenderness.   EXTREMITIES: No edema. Peripheral pulses intact.   NEUROLOGICAL: Non focal. Strength and sensation symmetric and intact throughout.   SKIN: R wrist rash  PSYCHIATRIC: AOx3 , Normal mood and affect

## 2022-04-20 NOTE — ED ADULT NURSE NOTE - AS SC BRADEN SENSORY
Patient : Clair Vieyra Age: 26 year old Sex: female   MRN: [id number] Encounter Date: 3/15/2022      History     Chief Complaint   Patient presents with   • Altered Mental Status     Pt is a 27 y/o female without known pmh presents from home with report of unresponsiveness.  EM caveat invoked secondary to unresponsiveness.  Sister reports that they had been drinking earlier this evening.  Sister stated that she woke stating she did not feel well and then stared vomiting.  Then stopped responding to her so she called 911.    History provided by: sister.   Altered Mental Status  Primary symptoms include altered mental status.       Not on File    There are no discharge medications for this patient.      No past medical history on file.    No past surgical history on file.    No family history on file.    Social History     Tobacco Use   • Smoking status: Not on file   • Smokeless tobacco: Not on file   Substance Use Topics   • Alcohol use: Not on file   • Drug use: Not on file       Review of Systems   Unable to perform ROS: Patient unresponsive       Physical Exam     ED Triage Vitals [03/15/22 0228]   ED Triage Vitals Group      Temp 99.5 °F (37.5 °C)      Pulse       Resp       BP       SpO2       EtCO2 mmHg       Height       Weight       Weight Scale Used       BMI (Calculated)       IBW/kg (Calculated)        Physical Exam  Vitals and nursing note reviewed.   Constitutional:       General: She is not in acute distress.     Appearance: She is obese.      Comments: Patient minimally responsive, protecting airway, breathing comfortably   HENT:      Head: Normocephalic and atraumatic.      Right Ear: Tympanic membrane and ear canal normal.      Left Ear: Tympanic membrane and ear canal normal.      Nose: Nose normal. No congestion.      Mouth/Throat:      Mouth: Mucous membranes are moist.      Pharynx: Oropharynx is clear.   Eyes:      Conjunctiva/sclera: Conjunctivae normal.      Pupils: Pupils are equal, round,  and reactive to light.   Cardiovascular:      Rate and Rhythm: Regular rhythm. Tachycardia present.      Pulses: Normal pulses.      Heart sounds: Normal heart sounds.   Pulmonary:      Effort: Pulmonary effort is normal.      Breath sounds: Normal breath sounds.   Abdominal:      General: Abdomen is flat. Bowel sounds are normal.      Palpations: Abdomen is soft.   Skin:     General: Skin is dry.   Neurological:      Comments: unresponsive   Psychiatric:         Mood and Affect: Mood normal.         Behavior: Behavior normal.         ED Course     Procedures    Lab Results     Results for orders placed or performed during the hospital encounter of 03/15/22   GLUCOSE, BEDSIDE - POINT OF CARE   Result Value Ref Range    GLUCOSE, BEDSIDE - POINT OF CARE 88 70 - 99 mg/dL       EKG Results     EKG Interpretation  Rate: 130  Rhythm: sinus tachycardia  AK = 142 ms  QRS = 68 ms    EKG tracing interpreted by ED physician    Radiology Results     Imaging Results    None         ED Medication Orders (From admission, onward)    None               MDM  Number of Diagnoses or Management Options  Diagnosis management comments: 25 y/o female presenst via ems unresponsive after drinking with her sister.  Protecting her airway.  Blood sugar = 88.  Will check cbc, cmp, utox and alcohol level.  Will also check a ct head.  Suspect presentation secondary to alcohol intoxication.      Clinical Impression     No diagnosis found.    Disposition        There is no disposition no dispo time  There is no comment                     Benjamin Sosa MD  03/15/22 0253     (4) no impairment

## 2022-04-20 NOTE — H&P ADULT - NSHPREVIEWOFSYSTEMS_GEN_ALL_CORE
CONSTITUTIONAL:  No weight loss, fever, chills, weakness or fatigue.  HEENT:  Eyes:  No visual loss, blurred vision, double vision or yellow sclerae. Ears, Nose, Throat:  No hearing loss, sneezing, congestion, runny nose or sore throat.  CARDIOVASCULAR:  No chest pain, chest pressure or chest discomfort. No palpitations.  RESPIRATORY:  No shortness of breath, cough or sputum.  GASTROINTESTINAL:  No anorexia, nausea, vomiting or diarrhea. No abdominal pain or blood.  GENITOURINARY:  Denies hematuria, dysuria.   NEUROLOGICAL:  No headache, dizziness, syncope, paralysis, ataxia, numbness or tingling in the extremities. No change in bowel or bladder control.  MUSCULOSKELETAL:  No muscle, back pain, joint pain or stiffness.  HEMATOLOGIC:  No anemia, bleeding or bruising.  LYMPHATICS:  No enlarged nodes.   PSYCHIATRIC:  No history of depression or anxiety.  ENDOCRINOLOGIC:  No reports of sweating, cold or heat intolerance. No polyuria or polydipsia.  ALLERGIES:  No history of asthma, hives, eczema or rhinitis. CONSTITUTIONAL:  No fever,  weakness or fatigue. + weight loss and chills  HEENT:  Eyes:  No visual loss, blurred vision, double vision or yellow sclerae. Ears, Nose, Throat:  No hearing loss, sneezing, congestion, runny nose or sore throat.  CARDIOVASCULAR:  No chest pain, chest pressure or chest discomfort. No palpitations.   RESPIRATORY:  No shortness of breath, cough or sputum.  GASTROINTESTINAL:  No constipation or diarrhea. No blood in stool. +N/V, abdominal pain  GENITOURINARY:  Denies dysuria. +hematuria  NEUROLOGICAL:  No headache, dizziness, syncope, paralysis, ataxia, numbness or tingling in the extremities. No change in bowel or bladder control.   MUSCULOSKELETAL:  No muscle, back pain, joint pain or stiffness.  HEMATOLOGIC:  No anemia, bleeding or bruising.  LYMPHATICS:  No enlarged nodes.   PSYCHIATRIC:  + history of depression or anxiety.  ENDOCRINOLOGIC:  No reports of sweating, cold or heat intolerance. No polyuria or polydipsia.  ALLERGIES:  No history of asthma, hives, eczema or rhinitis.

## 2022-04-20 NOTE — ED PROVIDER NOTE - OBJECTIVE STATEMENT
27 Y/O F H/O Peptic ulcer disease, Heroin, marijuana and benzodiazapine use currently on methadone presents 25 Y/O F H/O Peptic ulcer disease, Heroin, marijuana and benzodiazapine use currently on methadone presents with acute nausea and epigastric pain which began at 5AM. Pt reported to [facility] today and received her methadone but subsequently vomited. States she had an endoscopy at an OSH years ago and was admitted for 4 days for peptic ulcer disease. Pt states the pain is currently 9/10. Pt denies any other sx or acute complaints.

## 2022-04-20 NOTE — H&P ADULT - NSHPLABSRESULTS_GEN_ALL_CORE
LABS:                        12.7   11.02 )-----------( 270      ( 20 Apr 2022 08:29 )             39.6     20 Apr 2022 08:29    136    |  100    |  14     ----------------------------<  103    4.8     |  17     |  0.74     Ca    9.2        20 Apr 2022 08:29    TPro  7.4    /  Alb  4.8    /  TBili  0.4    /  DBili  x      /  AST  27     /  ALT  16     /  AlkPhos  60     20 Apr 2022 08:29      CAPILLARY BLOOD GLUCOSE        BLOOD CULTURE    RADIOLOGY & ADDITIONAL TESTS:    Imaging Personally Reviewed:  [ ] YES

## 2022-04-20 NOTE — CONSULT NOTE ADULT - SUBJECTIVE AND OBJECTIVE BOX
Chief Complaint:  Patient is a 26y old  Female who presents with a chief complaint of     HPI:     Otherwise, patient denies fevers, chills, weight loss, dysphagia, odynophagia, early satiety, poor oral intake, abdominal pain, nausea, vomiting, diarrhea, melena, hematemesis, hematochezia, change in stool caliber, or family history of GI-related cancers.    Allergies:  No Known Allergies      Home Medications:    Hospital Medications:  nicotine - 21 mG/24Hr(s) Patch 1 patch Transdermal daily  pantoprazole  Injectable 40 milliGRAM(s) IV Push two times a day      PMHX/PSHX:  UTI (urinary tract infection)    Drug abuse and dependence    Peptic ulcer disease    No significant past surgical history    No significant past surgical history        Family history:  No pertinent family history in first degree relatives    No pertinent family history in first degree relatives     Denies any family history of GI-related disease or cancers.    Social History:   ETOH: denies  Tobacco: denies  Illicit drug use: denies    ROS: 14 point ROS negative unless otherwise stated in HPI      Vital Signs:  Vital Signs Last 24 Hrs  T(C): 37.2 (2022 12:33), Max: 37.2 (2022 12:33)  T(F): 98.9 (2022 12:33), Max: 98.9 (2022 12:33)  HR: 63 (2022 12:33) (63 - 84)  BP: 105/53 (2022 12:33) (105/53 - 113/77)  BP(mean): --  RR: 16 (2022 12:33) (16 - 18)  SpO2: 99% (2022 12:33) (98% - 99%)  Daily Height in cm: 152.4 (2022 07:49)    Daily     PHYSICAL EXAM:     GENERAL:  Appears stated age, well-groomed, well-nourished, no distress  HEENT:  NC/AT,  conjunctivae clear and pink  CHEST:  Full & symmetric excursion, no increased effort, breath sounds clear  HEART:  Regular rhythm, S1, S2, no murmur/rub/S3/S4  ABDOMEN:  Soft, non-tender, non-distended, normoactive bowel sounds,    EXTREMITIES:  no cyanosis,clubbing or edema  SKIN:  No rash/erythema/ecchymoses/petechiae/wounds/abscess/warm/dry  NEURO:  Alert, oriented      LABS:                        12.7   11.02 )-----------( 270      ( 2022 08:29 )             39.6         136  |  100  |  14  ----------------------------<  103<H>  4.8   |  17<L>  |  0.74    Ca    9.2      2022 08:29    TPro  7.4  /  Alb  4.8  /  TBili  0.4  /  DBili  x   /  AST  27  /  ALT  16  /  AlkPhos  60      LIVER FUNCTIONS - ( 2022 08:29 )  Alb: 4.8 g/dL / Pro: 7.4 g/dL / ALK PHOS: 60 U/L / ALT: 16 U/L / AST: 27 U/L / GGT: x             Urinalysis Basic - ( 2022 11:56 )    Color: Colorless / Appearance: Clear / S.014 / pH: x  Gluc: x / Ketone: Negative  / Bili: Negative / Urobili: <2 mg/dL   Blood: x / Protein: Negative / Nitrite: Negative   Leuk Esterase: Negative / RBC: x / WBC x   Sq Epi: x / Non Sq Epi: x / Bacteria: x      Amylase Serum--      Lipase serum35       Ammonia--      Imaging:         ACC: [id number] EXAM:  CT ABDOMEN AND PELVIS IC                          PROCEDURE DATE:  2022          INTERPRETATION:  CLINICAL INFORMATION: Epigastric pain and vomiting.    COMPARISON: CT abdomen and pelvis 8/15/2020.    CONTRAST/COMPLICATIONS:  IV Contrast: Omnipaque 350  90 cc administered   10 cc discarded  Oral Contrast: NONE  Complications: None reported at time of study completion    PROCEDURE:  CT of the Abdomen and Pelvis was performed.  Sagittal and coronal reformats were performed.    FINDINGS:  LOWER CHEST: Within normal limits.    LIVER: Within normal limits.  BILE DUCTS: Normal caliber.  GALLBLADDER: Within normal limits.  SPLEEN: Within normal limits.  PANCREAS: Within normal limits.  ADRENALS: Within normal limits.  KIDNEYS/URETERS: A subcentimeter hypodense focus in the upper pole of the   left kidney that is too small to characterize. No hydronephrosis.    BLADDER: The urinary bladder is underdistended. However, there is diffuse   wall thickening that is disproportionate to underdistention with mucosal   hyperenhancement and mild perivesicular stranding.  REPRODUCTIVE ORGANS: Unremarkable CT appearance of the uterus. Right   corpus luteum.    BOWEL: Moderate colonic stool burden. No bowel dilation to suggest   obstruction. Appendix is not visualized.  PERITONEUM: No ascites or pneumoperitoneum.  VESSELS: Within normal limits.  RETROPERITONEUM/LYMPH NODES: No lymphadenopathy.  ABDOMINAL WALL: Within normal limits.  BONES: Bilateral L5 pars defects with unchanged trace anterolisthesis of   L5 over S1.,    IMPRESSION:  Findings suspicious for cystitis.    Otherwise, no acute intra-abdominal findings.             Chief Complaint:  Patient is a 26y old  Female who presents with a chief complaint of     HPI: 25 yo F with h/o polysubstance use disorder (h/o tobacco, cocaine, current daily marijuana use, opiate use- now on methadone) p/w acute onset abd pain a/w N/V/chills/dysuria/hematuria x1 day. Had similar episode that occurred last week that improved and similar episode 2-3 years ago during which time she had an EGD that showed ?ulcers and gastritis. Had orange emesis and ~8 lb weight loss in the last week but no diarrhea, dysphagia, odynophagia, or bloody BMs. Sx not improved with hot showers. Reports regular BMs, last BM in the ED. Uses marijuana multiple times per day, last used . LMP in 2022 (but cannot recall when). In the ED, VSS, labs relatively unremarkable, UhCG neg. CT A/P showed cystitis and stool in rectum but no other acute findings. GI consulted for abd pain, N/V.     Allergies:  No Known Allergies      Home Medications:    Hospital Medications:  nicotine - 21 mG/24Hr(s) Patch 1 patch Transdermal daily  pantoprazole  Injectable 40 milliGRAM(s) IV Push two times a day      PMHX/PSHX:  UTI (urinary tract infection)    Drug abuse and dependence    Peptic ulcer disease    No significant past surgical history    No significant past surgical history        Family history:   Denies any family history of GI-related disease or cancers.    Social History:   ETOH: +socially  Tobacco: 1/2 ppd  Drug use: +daily marijuana use; h/o opiate use now on methadone, h/o cocaine use in college    ROS: 14 point ROS negative unless otherwise stated in HPI      Vital Signs:  Vital Signs Last 24 Hrs  T(C): 37.2 (2022 12:33), Max: 37.2 (2022 12:33)  T(F): 98.9 (2022 12:33), Max: 98.9 (2022 12:33)  HR: 63 (2022 12:33) (63 - 84)  BP: 105/53 (2022 12:33) (105/53 - 113/77)  BP(mean): --  RR: 16 (2022 12:33) (16 - 18)  SpO2: 99% (2022 12:33) (98% - 99%)  Daily Height in cm: 152.4 (2022 07:49)    Daily     PHYSICAL EXAM:     GENERAL:  Appears stated age, well-groomed, well-nourished, no distress  HEENT:  NC/AT,  conjunctivae clear and pink  CHEST:  Full & symmetric excursion, no increased effort, breath sounds clear  HEART:  Regular rhythm, S1, S2, no murmur/rub/S3/S4  ABDOMEN:  Soft, +epigastric, LLQ and suprapubic-tenderness, non-distended, +bowel sounds    EXTREMITIES:  no cyanosis,clubbing or edema  SKIN:  No rash/erythema/ecchymoses/petechiae/wounds/abscess/warm/dry  NEURO:  Alert, orientedx3      LABS:                        12.7   11.02 )-----------( 270      ( 2022 08:29 )             39.6     04-20    136  |  100  |  14  ----------------------------<  103<H>  4.8   |  17<L>  |  0.74    Ca    9.2      2022 08:29    TPro  7.4  /  Alb  4.8  /  TBili  0.4  /  DBili  x   /  AST  27  /  ALT  16  /  AlkPhos  60  04-20    LIVER FUNCTIONS - ( 2022 08:29 )  Alb: 4.8 g/dL / Pro: 7.4 g/dL / ALK PHOS: 60 U/L / ALT: 16 U/L / AST: 27 U/L / GGT: x             Urinalysis Basic - ( 2022 11:56 )    Color: Colorless / Appearance: Clear / S.014 / pH: x  Gluc: x / Ketone: Negative  / Bili: Negative / Urobili: <2 mg/dL   Blood: x / Protein: Negative / Nitrite: Negative   Leuk Esterase: Negative / RBC: x / WBC x   Sq Epi: x / Non Sq Epi: x / Bacteria: x      Amylase Serum--      Lipase serum35       Ammonia--      Imaging:         ACC: [id number] EXAM:  CT ABDOMEN AND PELVIS IC                          PROCEDURE DATE:  2022          INTERPRETATION:  CLINICAL INFORMATION: Epigastric pain and vomiting.    COMPARISON: CT abdomen and pelvis 8/15/2020.    CONTRAST/COMPLICATIONS:  IV Contrast: Omnipaque 350  90 cc administered   10 cc discarded  Oral Contrast: NONE  Complications: None reported at time of study completion    PROCEDURE:  CT of the Abdomen and Pelvis was performed.  Sagittal and coronal reformats were performed.    FINDINGS:  LOWER CHEST: Within normal limits.    LIVER: Within normal limits.  BILE DUCTS: Normal caliber.  GALLBLADDER: Within normal limits.  SPLEEN: Within normal limits.  PANCREAS: Within normal limits.  ADRENALS: Within normal limits.  KIDNEYS/URETERS: A subcentimeter hypodense focus in the upper pole of the   left kidney that is too small to characterize. No hydronephrosis.    BLADDER: The urinary bladder is underdistended. However, there is diffuse   wall thickening that is disproportionate to underdistention with mucosal   hyperenhancement and mild perivesicular stranding.  REPRODUCTIVE ORGANS: Unremarkable CT appearance of the uterus. Right   corpus luteum.    BOWEL: Moderate colonic stool burden. No bowel dilation to suggest   obstruction. Appendix is not visualized.  PERITONEUM: No ascites or pneumoperitoneum.  VESSELS: Within normal limits.  RETROPERITONEUM/LYMPH NODES: No lymphadenopathy.  ABDOMINAL WALL: Within normal limits.  BONES: Bilateral L5 pars defects with unchanged trace anterolisthesis of   L5 over S1.,    IMPRESSION:  Findings suspicious for cystitis.    Otherwise, no acute intra-abdominal findings.           HPI: 25 yo F with h/o polysubstance use disorder (h/o tobacco, cocaine, current daily marijuana use, opiate use- now on methadone) p/w acute onset abd pain a/w N/V/chills/dysuria/hematuria x1 day.   Patient had similar episode that occurred last week that improved and similar episode 2-3 years ago during which time she had an EGD that showed ?ulcers and gastritis. Had orange emesis and ~8 lb weight loss in the last week but no diarrhea, dysphagia, odynophagia, or bloody BMs. Sx not improved with hot showers. Reports regular BMs, last BM in the ED. Uses marijuana multiple times per day, last used . LMP in 2022 (but cannot recall when). In the ED, VSS, labs relatively unremarkable, UhCG neg. CT A/P showed cystitis and stool in rectum but no other acute findings. GI consulted for abd pain, N/V.     Allergies:  No Known Allergies      Home Medications:    Hospital Medications:  nicotine - 21 mG/24Hr(s) Patch 1 patch Transdermal daily  pantoprazole  Injectable 40 milliGRAM(s) IV Push two times a day      PMHX/PSHX:  UTI (urinary tract infection)    Drug abuse and dependence    Peptic ulcer disease    No significant past surgical history    No significant past surgical history        Family history:   Denies any family history of GI-related disease or cancers.    Social History:   ETOH: +socially  Tobacco: 1/2 ppd  Drug use: +daily marijuana use; h/o opiate use now on methadone, h/o cocaine use in college    ROS: 14 point ROS negative unless otherwise stated in HPI      Vital Signs:  Vital Signs Last 24 Hrs  T(C): 37.2 (2022 12:33), Max: 37.2 (2022 12:33)  T(F): 98.9 (2022 12:33), Max: 98.9 (2022 12:33)  HR: 63 (2022 12:33) (63 - 84)  BP: 105/53 (2022 12:33) (105/53 - 113/77)  BP(mean): --  RR: 16 (2022 12:33) (16 - 18)  SpO2: 99% (2022 12:33) (98% - 99%)  Daily Height in cm: 152.4 (2022 07:49)    Daily     PHYSICAL EXAM:     GENERAL:  Appears stated age, well-groomed, well-nourished, no distress  HEENT:  NC/AT,  conjunctivae clear and pink  CHEST:  Full & symmetric excursion, no increased effort, breath sounds clear  HEART:  Regular rhythm, S1, S2, no murmur/rub/S3/S4  ABDOMEN:  Soft, +epigastric, LLQ and suprapubic-tenderness, non-distended, +bowel sounds    EXTREMITIES:  no cyanosis,clubbing or edema  SKIN:  No rash/erythema/ecchymoses/petechiae/wounds/abscess/warm/dry  NEURO/PSYCH: Tired, cooperative, calm, alert, orientedx3      LABS:                        12.7   11.02 )-----------( 270      ( 2022 08:29 )             39.6     04-20    136  |  100  |  14  ----------------------------<  103<H>  4.8   |  17<L>  |  0.74    Ca    9.2      2022 08:29    TPro  7.4  /  Alb  4.8  /  TBili  0.4  /  DBili  x   /  AST  27  /  ALT  16  /  AlkPhos  60  04-20    LIVER FUNCTIONS - ( 2022 08:29 )  Alb: 4.8 g/dL / Pro: 7.4 g/dL / ALK PHOS: 60 U/L / ALT: 16 U/L / AST: 27 U/L / GGT: x             Urinalysis Basic - ( 2022 11:56 )    Color: Colorless / Appearance: Clear / S.014 / pH: x  Gluc: x / Ketone: Negative  / Bili: Negative / Urobili: <2 mg/dL   Blood: x / Protein: Negative / Nitrite: Negative   Leuk Esterase: Negative / RBC: x / WBC x   Sq Epi: x / Non Sq Epi: x / Bacteria: x      Amylase Serum--      Lipase serum35       Ammonia--      Imaging:         ACC: [id number] EXAM:  CT ABDOMEN AND PELVIS IC                          PROCEDURE DATE:  2022          INTERPRETATION:  CLINICAL INFORMATION: Epigastric pain and vomiting.    COMPARISON: CT abdomen and pelvis 8/15/2020.    CONTRAST/COMPLICATIONS:  IV Contrast: Omnipaque 350  90 cc administered   10 cc discarded  Oral Contrast: NONE  Complications: None reported at time of study completion    PROCEDURE:  CT of the Abdomen and Pelvis was performed.  Sagittal and coronal reformats were performed.    FINDINGS:  LOWER CHEST: Within normal limits.    LIVER: Within normal limits.  BILE DUCTS: Normal caliber.  GALLBLADDER: Within normal limits.  SPLEEN: Within normal limits.  PANCREAS: Within normal limits.  ADRENALS: Within normal limits.  KIDNEYS/URETERS: A subcentimeter hypodense focus in the upper pole of the   left kidney that is too small to characterize. No hydronephrosis.    BLADDER: The urinary bladder is underdistended. However, there is diffuse   wall thickening that is disproportionate to underdistention with mucosal   hyperenhancement and mild perivesicular stranding.  REPRODUCTIVE ORGANS: Unremarkable CT appearance of the uterus. Right   corpus luteum.    BOWEL: Moderate colonic stool burden. No bowel dilation to suggest   obstruction. Appendix is not visualized.  PERITONEUM: No ascites or pneumoperitoneum.  VESSELS: Within normal limits.  RETROPERITONEUM/LYMPH NODES: No lymphadenopathy.  ABDOMINAL WALL: Within normal limits.  BONES: Bilateral L5 pars defects with unchanged trace anterolisthesis of   L5 over S1.,    IMPRESSION:  Findings suspicious for cystitis.    Otherwise, no acute intra-abdominal findings.

## 2022-04-20 NOTE — ED ADULT NURSE REASSESSMENT NOTE - NS ED NURSE REASSESS COMMENT FT1
Pt sleeping in stretcher in no apparent distress. Arouses with gentle stimulation. denies discomfort. Respirations even, non-labored. Skin warm, dry, color appropriate. Pending admission. MD notified that pt requesting a note for court. Mother at bedside. call bell within reach. Education provided to pt on how to and when to use call bell for assistance. Will continue to monitor. PAXTON Cervantes

## 2022-04-20 NOTE — ED PROVIDER NOTE - CONSTITUTIONAL, MLM
normal... Uncomfortable appearing, awake, alert, oriented to person, place, time/situation and in no immediate distress.

## 2022-04-20 NOTE — H&P ADULT - HISTORY OF PRESENT ILLNESS
THIS NOTE IS NOT COMPLETE THIS NOTE IS NOT COMPLETE THIS NOTE IS NOT COMPLETE THIS NOTE IS NOT COMPLETE THIS NOTE IS NOT COMPLETE THIS NOTE IS NOT COMPLETE      A 25yo female w/ a PMHx of PUD, drug use (heroin, marijuana, benzodiazipines) currently on methadone presents to the emergency room for nausea. vomitting and epigastric pain. Per patient this began early this morning and has gotten worse _____> She went to [facility] this morning to obtain methadone and had an episode of ______ emesis there. While in [facility] ED she had multiple bouts of emesis.    On presentation her vitals were 113/77, 84 bpm, afebrile and satting well on room air.  In the ED she recieved 2L N.Sm 1000mg IV tylenol, 2mg ativan, 4mg zofran and 10mg reglan.  CTA/P was negative for intra-abdominal pathology, and shows questionable cystitis 25yo female w/ a PMHx of PUD, drug use (heroin, marijuana, benzodiazepines currently on methadone presents to the emergency room for nausea, vomiting and epigastric pain. Per patient she had one episode of nausea and stomach pain last week for 24 hours, and meant to come to the emergency room but was unable to coordinate it with her family. The stomach pain persisted mildly, until this morning when she woke up early AM and began vomiting. Her first emesis was a little orange, and afterwards it was NBNB. She states her pain is mostly lower abdomen, feels like someone is punching her in the stomach. She has had decreased PO intake over the past week, feels like she has lost 7-8 pounds over the past two weeks. She endorses no relationship to food intake that she has noticed. She smokes marijuana daily, the last time she smoked was yesterday, but does not endorse worsening nausea once she stops smoking (she endorses decreased PO intake in periods she doesn't smoke) or nausea association with hot showers. She was treated for a UTI 1 month ago, she endorses no dysuria and sometimes notices a little hematuria. She has some chills. A few years ago she was hospitalized with PUD at [facility] and EGD revealed gastritis. She was treated conservatively at the time. She denies any new medications or over the counter supplements. Reports irregular periods, but no severe dysmenorrhea. She denies headaches, blurred vision, chest pain, SOB, cough, new sick contacts, recent travel, constipation or diarrhea.     On presentation her vitals were 113/77, 84 bpm, afebrile and satting well on room air.   In the ED she recieved 2L N.Sm 1000mg IV tylenol, 2mg ativan, 4mg zofran and 10mg reglan.  CTA/P was negative for intra-abdominal pathology, and shows questionable cystitis 25yo female w/ a PMHx of PUD, drug use (heroin, marijuana currently on methadone presents to the emergency room for nausea, vomiting and epigastric pain. Per patient she had one episode of nausea and stomach pain last week for 24 hours, and meant to come to the emergency room but was unable to coordinate it with her family. The stomach pain persisted mildly, until this morning when she woke up early AM and began vomiting. Her first emesis was a little orange, and afterwards it was NBNB. She states her pain is mostly lower abdomen, feels like someone is punching her in the stomach. She has had decreased PO intake over the past week, feels like she has lost 7-8 pounds over the past two weeks. She endorses no relationship to food intake that she has noticed. She smokes marijuana daily, the last time she smoked was yesterday, but does not endorse worsening nausea once she stops smoking (she endorses decreased PO intake in periods she doesn't smoke) or nausea association with hot showers. She was treated for a UTI 1 month ago, she endorses no dysuria and sometimes notices a little hematuria. She has some chills. A few years ago she was hospitalized with PUD at [facility] and EGD revealed gastritis. She was treated conservatively at the time. She denies any new medications or over the counter supplements. Reports irregular periods, but no severe dysmenorrhea. She denies headaches, blurred vision, chest pain, SOB, cough, new sick contacts, recent travel, constipation or diarrhea.     On presentation her vitals were 113/77, 84 bpm, afebrile and satting well on room air.   In the ED she recieved 2L N.Sm 1000mg IV tylenol, 2mg ativan, 4mg zofran and 10mg reglan.  CTA/P was negative for intra-abdominal pathology, and shows questionable cystitis

## 2022-04-20 NOTE — H&P ADULT - PROBLEM SELECTOR PROBLEM 5
Cannabis hyperemesis syndrome concurrent with and due to cannabis abuse Prophylactic measure Cystitis

## 2022-04-20 NOTE — PATIENT PROFILE ADULT - FALL HARM RISK - RISK INTERVENTIONS

## 2022-04-20 NOTE — ED ADULT NURSE NOTE - OBJECTIVE STATEMENT
Pt presents to ED ambulatory with steady gait c/o 10/10 sharp diffuse abdominal pain with associated nausea/vomiting that started last pm. States she has hx of gastroparesis and ulcers but has never f/u with a GI. States she has not been able to tolerate PO. Denies diarrhea, fevers, urinary sx, or other complaints. Pt endorses to smoking marijuana last night. Pt is moaning and shifting in stretcher. 22G Erwin placed in left hand by night RN. Flushed well with 10cc NS with good blood return. No s/s discomfort or erythema at insertion site. Blood obtained by previous RN and sent to lab. Medicated as ordered. Pending urine. Family at bedside. call bell within reach. Education provided to pt on how to and when to use call bell for assistance. Will continue to monitor. PAXTON Cervantes

## 2022-04-20 NOTE — H&P ADULT - PROBLEM SELECTOR PLAN 1
Could be secondary to gastritis, less likely infectious (labs not significant, no fevers)  UA negative, less likely cystitis  Less likely cannabis hyperemesis syndrome as pt reports previously not smoking and no increased N/V  EKG QTc 499    Plan:  - F/up GI consult  - Advance diet as tolerated  - 40 IV pantoprazole daily  - Zofran prn  - Monitor QTc w/ daily EKGs Could be secondary to gastritis, less likely infectious (labs not significant, no fevers)  UA negative, less likely cystitis  Could be cannabis hyperemesis syndrome as patient has had similar episodes in the past   EKG QTc 499    Plan:  - F/up GI consult  - 40 pantoprazole PO  - Zofran prn  - Monitor QTc w/ daily EKGs

## 2022-04-20 NOTE — H&P ADULT - NSHPSOCIALHISTORY_GEN_ALL_CORE
Patient smoked 1/2-1 pack of cigarettes daily for 11 years, smokes marijuana 1-2x a day, prev hx of heroin/opiod use in the past but has been in recovery for >1 year, has tried benzos and cocaine in college. Drinks socially. Lives at home with her parents.

## 2022-04-20 NOTE — ED ADULT TRIAGE NOTE - CHIEF COMPLAINT QUOTE
Pt BIBEMS from [facility] Methadone clinic for abd pain and nausea/vomiting this am. States vomited 7 times. Actively vomiting and appears uncomfortable.  Pmhx: Drug abusE Pt BIBEMS from [facility] Methadone clinic for abd pain and nausea/vomiting this am. States vomited 7 times. Dry heaving in triage. Pmhx: Drug abuse Pt BIBEMS from [facility] Methadone clinic for abd pain and nausea/vomiting this am. States vomited 7 times. Dry heaving in triage. Pmhx: Drug abuse, gastritis, ulcers

## 2022-04-20 NOTE — H&P ADULT - PROBLEM SELECTOR PLAN 2
85 mg PO methadone daily    Plan  - c/w dose F/up utox to ensure patient does not withdraw from other substances

## 2022-04-20 NOTE — H&P ADULT - ASSESSMENT
26F w/ a PMHx of PUD, drug use (heroin, marijuana, benzodiazipines) currently on methadone presents to the emergency room for nausea and vomitting, likely thought to be secondary to ___________ 26F w/ a PMHx of PUD, drug use (heroin, marijuana, benzodiazipines) currently on methadone presents to the emergency room for nausea and vomitting

## 2022-04-20 NOTE — CONSULT NOTE ADULT - ATTENDING COMMENTS
#Abdominal pain, nausea, vomiting  #History of drug use, including heroin, benzodiazepines, and marijuana, on methadone with ongoing marijuana use  #CT with stool burden and possible cystitis    --PPI BID  --Supportive care with anti-emetics, pain management as needed, IVF and diet as tolerated  --Would defer EGD at this time as patient reports prior EGD in recent years for similar symptoms. If symptoms persist despite medical management, can readdress inpatient endoscopic evaluation for diagnostic purposes.   --Further recommendations as above

## 2022-04-20 NOTE — CONSULT NOTE ADULT - ASSESSMENT
#abd pain + N/V- d/dx includes cystitis (also reporting chills, dysuria, and hematuria) vs gastroenteritis vs PID vs ovarian torsion vs mittelchemerz vs cannabis hyperemesis    Recommendations       27 yo F with h/o polysubstance use disorder (h/o tobacco, cocaine, current daily marijuana use, opiate use- now on methadone) p/w acute onset abd pain a/w N/V/chills/dysuria/hematuria x1 day. Had similar episode that occurred last week that improved and similar episode 2-3 years ago during which time she had an EGD that showed ?ulcers and gastritis. Uses marijuana multiple times per day, last used 4/19. In the ED, VSS, labs relatively unremarkable. CT A/P showed cystitis and stool in rectum but no other acute findings. GI consulted for abd pain, N/V.     Impression:  #abd pain + N/V- d/dx includes cystitis (also reporting chills, dysuria, and hematuria) vs gastroenteritis vs PID vs mittelchemerz vs cannabis hyperemesis  #?h/o PUD  #polysubstance use disorder (h/o tobacco, cocaine, current daily marijuana use, opiate use- now on methadone)    Recommendations:  - consider repeating UA given sx of dysuria and hematuria and neg UA  - supportive care- IVFs, antiemetics per primary team  - no plan for EGD at this time  - ok to start empiric IV PPI 40 mg daily    **THIS NOTE IS NOT FINALIZED UNTIL SIGNED BY THE ATTENDING**    Lynda Moeller MD  GI Fellow, PGY-4  Available via [employer] Teams    NON-URGENT CONSULTS:  Please email [email or web address]  AT NIGHT AND ON WEEKENDS:  Contact on-call GI fellow via answering service ([phone]) from 5pm-8am and on weekends/holidays  MONDAY-FRIDAY 8AM-5PM:  Pager# [phone] ([facility] or [phone] ([facility])  GI Phone# [phone] ([facility])       27 yo F with h/o polysubstance use disorder (h/o tobacco, cocaine, current daily marijuana use, opiate use- now on methadone) p/w acute onset abd pain a/w N/V/chills/dysuria/hematuria x1 day. GI consulted for abd pain, N/V.     Impression:  #abd pain + N/V- d/dx includes cystitis (also reporting chills, possible dysuria and hematuria) vs gastroenteritis vs PID vs mittelchemerz vs cannabis hyperemesis  #?h/o PUD  #polysubstance use disorder (h/o tobacco, cocaine, current daily marijuana use, opiate use- now on methadone)    Recommendations:  - consider repeating UA given reported sx of dysuria and hematuria and neg UA  - supportive care- IVFs, antiemetics per primary team  - no immediate plans for EGD at this time, but can consider if symptoms persist despite supportive measures (patient agreeable and prefers to trial medical management first)  - ok to start empiric IV PPI 40 mg daily    **THIS NOTE IS NOT FINALIZED UNTIL SIGNED BY THE ATTENDING**    Lynda Moeller MD  GI Fellow, PGY-4  Available via [employer] Teams    NON-URGENT CONSULTS:  Please email [email or web address]  AT NIGHT AND ON WEEKENDS:  Contact on-call GI fellow via answering service ([phone]) from 5pm-8am and on weekends/holidays  MONDAY-FRIDAY 8AM-5PM:  Pager# [phone] ([facility]) or [phone] ([facility])  GI Phone# [phone] ([facility])

## 2022-04-20 NOTE — ED PROVIDER NOTE - NSICDXPASTMEDICALHX_GEN_ALL_CORE_FT
PAST MEDICAL HISTORY:  Drug abuse and dependence     Peptic ulcer disease     UTI (urinary tract infection)

## 2022-04-20 NOTE — ED PROVIDER NOTE - ABDOMINAL EXAM
+ Diffuse abdominal tenderness to palpation, no rebound or guarding, non-distended, no palpable masses.

## 2022-04-21 ENCOUNTER — TRANSCRIPTION ENCOUNTER (OUTPATIENT)
Age: 27
End: 2022-04-21

## 2022-04-21 VITALS
OXYGEN SATURATION: 100 % | SYSTOLIC BLOOD PRESSURE: 101 MMHG | DIASTOLIC BLOOD PRESSURE: 60 MMHG | RESPIRATION RATE: 18 BRPM | TEMPERATURE: 98 F | HEART RATE: 74 BPM

## 2022-04-21 LAB
ANION GAP SERPL CALC-SCNC: 12 MMOL/L — SIGNIFICANT CHANGE UP (ref 7–14)
BUN SERPL-MCNC: 9 MG/DL — SIGNIFICANT CHANGE UP (ref 7–23)
CALCIUM SERPL-MCNC: 8.3 MG/DL — LOW (ref 8.4–10.5)
CHLORIDE SERPL-SCNC: 104 MMOL/L — SIGNIFICANT CHANGE UP (ref 98–107)
CO2 SERPL-SCNC: 21 MMOL/L — LOW (ref 22–31)
CREAT SERPL-MCNC: 0.68 MG/DL — SIGNIFICANT CHANGE UP (ref 0.5–1.3)
CULTURE RESULTS: SIGNIFICANT CHANGE UP
EGFR: 123 ML/MIN/1.73M2 — SIGNIFICANT CHANGE UP
GLUCOSE SERPL-MCNC: 84 MG/DL — SIGNIFICANT CHANGE UP (ref 70–99)
HCT VFR BLD CALC: 38.5 % — SIGNIFICANT CHANGE UP (ref 34.5–45)
HGB BLD-MCNC: 12.3 G/DL — SIGNIFICANT CHANGE UP (ref 11.5–15.5)
MAGNESIUM SERPL-MCNC: 1.9 MG/DL — SIGNIFICANT CHANGE UP (ref 1.6–2.6)
MCHC RBC-ENTMCNC: 30.7 PG — SIGNIFICANT CHANGE UP (ref 27–34)
MCHC RBC-ENTMCNC: 31.9 GM/DL — LOW (ref 32–36)
MCV RBC AUTO: 96 FL — SIGNIFICANT CHANGE UP (ref 80–100)
NRBC # BLD: 0 /100 WBCS — SIGNIFICANT CHANGE UP
NRBC # FLD: 0 K/UL — SIGNIFICANT CHANGE UP
PHOSPHATE SERPL-MCNC: 3.3 MG/DL — SIGNIFICANT CHANGE UP (ref 2.5–4.5)
PLATELET # BLD AUTO: 215 K/UL — SIGNIFICANT CHANGE UP (ref 150–400)
POTASSIUM SERPL-MCNC: 4 MMOL/L — SIGNIFICANT CHANGE UP (ref 3.5–5.3)
POTASSIUM SERPL-SCNC: 4 MMOL/L — SIGNIFICANT CHANGE UP (ref 3.5–5.3)
RBC # BLD: 4.01 M/UL — SIGNIFICANT CHANGE UP (ref 3.8–5.2)
RBC # FLD: 16.7 % — HIGH (ref 10.3–14.5)
SODIUM SERPL-SCNC: 137 MMOL/L — SIGNIFICANT CHANGE UP (ref 135–145)
SPECIMEN SOURCE: SIGNIFICANT CHANGE UP
WBC # BLD: 4.98 K/UL — SIGNIFICANT CHANGE UP (ref 3.8–10.5)
WBC # FLD AUTO: 4.98 K/UL — SIGNIFICANT CHANGE UP (ref 3.8–10.5)

## 2022-04-21 PROCEDURE — 99239 HOSP IP/OBS DSCHRG MGMT >30: CPT | Mod: GC

## 2022-04-21 PROCEDURE — 99232 SBSQ HOSP IP/OBS MODERATE 35: CPT | Mod: GC

## 2022-04-21 RX ORDER — METHADONE HYDROCHLORIDE 40 MG/1
17 TABLET ORAL
Qty: 0 | Refills: 0 | DISCHARGE
Start: 2022-04-21

## 2022-04-21 RX ORDER — HYDROXYZINE HCL 10 MG
1 TABLET ORAL
Qty: 0 | Refills: 0 | DISCHARGE

## 2022-04-21 RX ORDER — SODIUM CHLORIDE 9 MG/ML
500 INJECTION, SOLUTION INTRAVENOUS ONCE
Refills: 0 | Status: COMPLETED | OUTPATIENT
Start: 2022-04-21 | End: 2022-04-21

## 2022-04-21 RX ORDER — PANTOPRAZOLE SODIUM 20 MG/1
1 TABLET, DELAYED RELEASE ORAL
Qty: 30 | Refills: 0
Start: 2022-04-21 | End: 2022-05-20

## 2022-04-21 RX ORDER — HYDROXYZINE HCL 10 MG
0 TABLET ORAL
Qty: 0 | Refills: 0 | DISCHARGE

## 2022-04-21 RX ADMIN — Medication 1 PATCH: at 12:02

## 2022-04-21 RX ADMIN — PANTOPRAZOLE SODIUM 40 MILLIGRAM(S): 20 TABLET, DELAYED RELEASE ORAL at 05:25

## 2022-04-21 RX ADMIN — SODIUM CHLORIDE 500 MILLILITER(S): 9 INJECTION, SOLUTION INTRAVENOUS at 06:51

## 2022-04-21 RX ADMIN — Medication 100 MILLIGRAM(S): at 05:26

## 2022-04-21 RX ADMIN — METHADONE HYDROCHLORIDE 85 MILLIGRAM(S): 40 TABLET ORAL at 12:02

## 2022-04-21 RX ADMIN — CITALOPRAM 40 MILLIGRAM(S): 10 TABLET, FILM COATED ORAL at 12:03

## 2022-04-21 NOTE — PROGRESS NOTE ADULT - PROBLEM SELECTOR PLAN 5
Cystitis seen on CT, patient has history of UTI tx w/ nitrofurantoin one month ago     Plan:  - no need to treat at this time  - CTM

## 2022-04-21 NOTE — DISCHARGE NOTE PROVIDER - NSDCMRMEDTOKEN_GEN_ALL_CORE_FT
CeleXA 40 mg oral tablet: 1 tab(s) orally once a day  HYDROXYZINE BUDDY 50 MG CAP: 1 each orally 2 times a day  methadone 5 mg oral tablet: 17 tab(s) orally once a day  pantoprazole 40 mg oral delayed release tablet: 1 tab(s) orally once a day  PRAZOSIN HYDROCHLORIDE 2MG CAP:   SEROquel 50 mg oral tablet: 1  orally once a day

## 2022-04-21 NOTE — DISCHARGE NOTE NURSING/CASE MANAGEMENT/SOCIAL WORK - NURSING SECTION COMPLETE
06/09/17 1319   Group III   Start Time 1230   StopTime 1330   Group Length (min) 60 min   Group Type Lunch   Group Focus Meal plan compliance   Number in attendance 8   Patient Response Ate 100%      Patient/Caregiver provided printed discharge information.

## 2022-04-21 NOTE — PROGRESS NOTE ADULT - PROBLEM SELECTOR PLAN 1
Could be secondary to gastritis, less likely infectious (labs not significant, no fevers)  UA negative, less likely cystitis  Could be cannabis hyperemesis syndrome as patient has had similar episodes in the past   EKG QTc 499  No need for urgent scope per GI    Plan:  - PPI BID  - Zofran prn  - Monitor QTc w/ daily EKGs

## 2022-04-21 NOTE — PROGRESS NOTE ADULT - PROBLEM SELECTOR PLAN 3
CTA/P showed stool burden in rectum    Plan  - c/w 85 mg methadone daily  - miralax and senna daily to help with bowel movements

## 2022-04-21 NOTE — DISCHARGE NOTE PROVIDER - NSFOLLOWUPCLINICS_GEN_ALL_ED_FT
[facility] [place]  Internal Medicine  [address] [place] [address]  Phone: [phone]  Fax: [phone]

## 2022-04-21 NOTE — PROGRESS NOTE ADULT - ATTENDING COMMENTS
Patient with continued clinical improvement on PPI.  Likely DC home today.   If symptoms persist or recur, can assess need for EGD.
26 y.o. F with hx of PUD, anxiety, polysubstance abuse (heroin, marijuana, benzodiazepines) currently on methadone who presented with N/V and lower abdominal pain. Pt was unable to take PO yesterday, but tolerated solids in the ED. Continues to tolerate PO today.     Today, pt states her symptoms are significantly improved. No more N/V. Lower abdominal pain is 3/10.    #Emesis and abdominal pain:  -Resolved  -ddx includes gastritis, viral enteritis or cannabinoid hyperemesis syndrome  -On chart review, there is an admission at OSH for very similar N/V symptoms in 2020; ;this repetitive pattern of admissions makes me suspect cannabinoid hyperemesis more  -now tolerating PO, on PO and on regular diet  -recommend pt to continue to use Miralax at home due to her constipation from opioid use     #Cystitis:   -patient has radiographic evidence of cystitis, but denies symptoms  -states she had a UTI a month ago that has since been treated  -no indication for abx at this time    Patient is medically stable for discharge. A total of 38 minutes were spent on discharge coordination.
none

## 2022-04-21 NOTE — DISCHARGE NOTE NURSING/CASE MANAGEMENT/SOCIAL WORK - PATIENT PORTAL LINK FT
You can access the FollowMyHealth Patient Portal offered by [facility] by registering at the following website: [email or web address]. By joining [employer]’s FollowMyHealth portal, you will also be able to view your health information using other applications (apps) compatible with our system.

## 2022-04-21 NOTE — DISCHARGE NOTE PROVIDER - NSDCCPCAREPLAN_GEN_ALL_CORE_FT
PRINCIPAL DISCHARGE DIAGNOSIS  Diagnosis: Acute abdominal pain  Assessment and Plan of Treatment: You had abdominal pain during your stay. This may have been secondary to gastritis. Your pain improved while you were here on pantoprazole. Please continue to take this medication and follow up with your PCP for further work up.  If you develop fevers, chills, bloody vomitting, dizziness, extreme pain please return to the ED for further evaluation      SECONDARY DISCHARGE DIAGNOSES  Diagnosis: Emesis  Assessment and Plan of Treatment: You had a lot of vomitting which caused you to come into the ED. We gave you IV hydration and zofran for nausea. The zofran helped control your nausea. Please continue to hydrate yourself. This nausea and vomitting may have been secondary to chronic cannabis use, please follow up with your psychiatrist about how to manage this and prevent further symptoms.

## 2022-04-21 NOTE — DISCHARGE NOTE PROVIDER - HOSPITAL COURSE
26F w/ a PMHx of PUD, previous heroin use currently on methadone, and marijuana presents to the emergency room for nausea and vomiting over the past few hours prior to admission secondary to abdominal pain.    On presentation her vitals were 113/77, 84 bpm, afebrile and satting well on room air. In the ED she recieved 2L N.S 1000mg IV tylenol, 2mg ativan, 4mg zofran and 10mg reglan. CTA/P was negative for intra-abdominal pathology, and showed potential cystitis    During her hospital course, the patient received zofran prn which helped control her nausea and her stomach pain decreased during her stay. She was continued on her home methadone dose of 85mg PO daily. CT A/P showed potential cystitis, however the patient denied any symptoms and stated she was treated for a UTI recently with nitrofurantoin. Her urinalysis was negative for an acute infection.    She was evaluated by GI who stated she did not need an EGD at this time. The patients condition is improved during her stay whiel taking pantoprazole, and she endorses less pain.    She is medically stable and ready for discharge. She can follow up further outpatient.

## 2022-04-21 NOTE — DISCHARGE NOTE PROVIDER - NSDCFUADDAPPT_GEN_ALL_CORE_FT
[facility] Information:    -Walk-in hours: Monday to Friday, 9am to 3pm   -Almost all walk-in patients will be able to see a psych prescriber the same day   -Scheduled, non-urgent, evening remote/virtual appointments are available on a limited basis. Call our  to inquire about these: [phone]. A crisis center clinician screens these requests in the late afternoon and if appropriate it takes a few days to set-up.   -Visits take about 2 to 4 hours total   -Mornings are the best time for patients to arrive    For Telehealth options try:  [facility]: [email or web address] (to access psychiatrist or therapist)  [facility]: [email or web address] (to access psychiatrist or therapist)  [facility]: [email or web address] (Largest online therapy group)

## 2022-04-21 NOTE — PROGRESS NOTE ADULT - SUBJECTIVE AND OBJECTIVE BOX
Chief Complaint:  Patient is a 26y old  Female who presents with a chief complaint of     Interval Events: Reports feeling improving periumbilical and suprapubic pain. Denies any N/V (last episode was ), melena or hematochezia. Dysuria and hematuria have resolved but still having chills.      Hospital Medications:  citalopram 40 milliGRAM(s) Oral daily  doxazosin 2 milliGRAM(s) Oral at bedtime  hydrOXYzine hydrochloride 50 milliGRAM(s) Oral at bedtime PRN  methadone    Tablet 85 milliGRAM(s) Oral daily  nicotine - 21 mG/24Hr(s) Patch 1 patch Transdermal daily  pantoprazole  Injectable 40 milliGRAM(s) IV Push two times a day  phenazopyridine 100 milliGRAM(s) Oral every 8 hours  QUEtiapine 50 milliGRAM(s) Oral at bedtime      PMHX/PSHX:  UTI (urinary tract infection)    Drug abuse and dependence    Peptic ulcer disease    No significant past surgical history    No significant past surgical history            ROS: 14 point ROS negative unless otherwise stated in subjective      PHYSICAL EXAM:     GENERAL:  Well developed, no distress  HEENT:  NC/AT,  conjunctivae clear, sclera anicteric  CHEST:  Full & symmetric excursion, no increased effort w/ respirations  HEART:  Regular rhythm & rate  ABDOMEN:  Soft, +periumbilical and suprapubic tenderness, non-distended  EXTREMITIES:  no LE  edema  SKIN:  No rash/erythema/ecchymoses/petechiae/wounds/jaundice  NEURO:  Alert, orientedx3    Vital Signs:  Vital Signs Last 24 Hrs  T(C): 36.8 (2022 06:46), Max: 37.4 (2022 17:26)  T(F): 98.2 (2022 06:46), Max: 99.4 (2022 17:26)  HR: 74 (2022 06:46) (63 - 84)  BP: 101/60 (2022 06:46) (101/60 - 112/61)  BP(mean): --  RR: 18 (2022 06:46) (18 - 18)  SpO2: 100% (2022 06:46) (96% - 100%)  Daily Height in cm: 157.48 (2022 17:26)    Daily     LABS:                        12.3   4.98  )-----------( 215      ( 2022 06:59 )             38.5     -    137  |  104  |  9   ----------------------------<  84  4.0   |  21<L>  |  0.68    Ca    8.3<L>      2022 06:59  Phos  3.3     -  Mg     1.90     -    TPro  7.4  /  Alb  4.8  /  TBili  0.4  /  DBili  x   /  AST  27  /  ALT  16  /  AlkPhos  60  04-20    LIVER FUNCTIONS - ( 2022 08:29 )  Alb: 4.8 g/dL / Pro: 7.4 g/dL / ALK PHOS: 60 U/L / ALT: 16 U/L / AST: 27 U/L / GGT: x             Urinalysis Basic - ( 2022 11:56 )    Color: Colorless / Appearance: Clear / S.014 / pH: x  Gluc: x / Ketone: Negative  / Bili: Negative / Urobili: <2 mg/dL   Blood: x / Protein: Negative / Nitrite: Negative   Leuk Esterase: Negative / RBC: x / WBC x   Sq Epi: x / Non Sq Epi: x / Bacteria: x          Imaging: No new abdominal imaging             Interval Events: Reports feeling improving periumbilical and suprapubic pain. Denies any N/V (last episode was ), melena or hematochezia. Dysuria and hematuria have resolved but still having chills.      Hospital Medications:  citalopram 40 milliGRAM(s) Oral daily  doxazosin 2 milliGRAM(s) Oral at bedtime  hydrOXYzine hydrochloride 50 milliGRAM(s) Oral at bedtime PRN  methadone    Tablet 85 milliGRAM(s) Oral daily  nicotine - 21 mG/24Hr(s) Patch 1 patch Transdermal daily  pantoprazole  Injectable 40 milliGRAM(s) IV Push two times a day  phenazopyridine 100 milliGRAM(s) Oral every 8 hours  QUEtiapine 50 milliGRAM(s) Oral at bedtime      PMHX/PSHX:  UTI (urinary tract infection)    Drug abuse and dependence    Peptic ulcer disease    No significant past surgical history    No significant past surgical history            ROS: 14 point ROS negative unless otherwise stated in subjective      PHYSICAL EXAM:     GENERAL:  Well developed, no distress  HEENT:  NC/AT,  conjunctivae clear, sclera anicteric  CHEST:  Full & symmetric excursion, no increased effort w/ respirations  HEART:  Regular rhythm & rate  ABDOMEN:  Soft, +periumbilical and suprapubic tenderness, non-distended  EXTREMITIES:  no LE  edema  SKIN:  No rash/erythema/ecchymoses/petechiae/wounds/jaundice  NEURO:  Alert, orientedx3    Vital Signs:  Vital Signs Last 24 Hrs  T(C): 36.8 (2022 06:46), Max: 37.4 (2022 17:26)  T(F): 98.2 (2022 06:46), Max: 99.4 (2022 17:26)  HR: 74 (2022 06:46) (63 - 84)  BP: 101/60 (2022 06:46) (101/60 - 112/61)  BP(mean): --  RR: 18 (2022 06:46) (18 - 18)  SpO2: 100% (2022 06:46) (96% - 100%)  Daily Height in cm: 157.48 (2022 17:26)    Daily     LABS:                        12.3   4.98  )-----------( 215      ( 2022 06:59 )             38.5     04-    137  |  104  |  9   ----------------------------<  84  4.0   |  21<L>  |  0.68    Ca    8.3<L>      2022 06:59  Phos  3.3     04-  Mg     1.90     -    TPro  7.4  /  Alb  4.8  /  TBili  0.4  /  DBili  x   /  AST  27  /  ALT  16  /  AlkPhos  60  04-20    LIVER FUNCTIONS - ( 2022 08:29 )  Alb: 4.8 g/dL / Pro: 7.4 g/dL / ALK PHOS: 60 U/L / ALT: 16 U/L / AST: 27 U/L / GGT: x             Urinalysis Basic - ( 2022 11:56 )    Color: Colorless / Appearance: Clear / S.014 / pH: x  Gluc: x / Ketone: Negative  / Bili: Negative / Urobili: <2 mg/dL   Blood: x / Protein: Negative / Nitrite: Negative   Leuk Esterase: Negative / RBC: x / WBC x   Sq Epi: x / Non Sq Epi: x / Bacteria: x          Imaging: No new abdominal imaging

## 2022-04-21 NOTE — PROGRESS NOTE ADULT - ASSESSMENT
25 yo F with h/o polysubstance use disorder (h/o tobacco, cocaine, current daily marijuana use, opiate use- now on methadone) p/w acute onset abd pain a/w N/V/chills/dysuria/hematuria x1 day. GI consulted for abd pain, N/V which is clinically improving with supportive care.    Impression:  #abd pain + N/V- d/dx includes cystitis (also reporting chills, possible dysuria and hematuria) vs gastroenteritis vs PID vs mittelchemerz vs cannabis hyperemesis  #?h/o PUD  #polysubstance use disorder (h/o tobacco, cocaine, current daily marijuana use, opiate use- now on methadone)    Recommendations:  - consider repeating UA given reported sx of dysuria and hematuria and neg UA  - supportive care- IVFs, antiemetics per primary team  - no immediate plans for EGD at this time, but can consider if symptoms persist despite supportive measures (patient agreeable and prefers to trial medical management first)  - ok to c/w PPI 40 mg daily    **THIS NOTE IS NOT FINALIZED UNTIL SIGNED BY THE ATTENDING**    Lynda Moeller MD  GI Fellow, PGY-4  Available via [employer] Teams    NON-URGENT CONSULTS:  Please email [email or web address]  AT NIGHT AND ON WEEKENDS:  Contact on-call GI fellow via answering service ([phone]) from 5pm-8am and on weekends/holidays  MONDAY-FRIDAY 8AM-5PM:  Pager# [phone] ([facility]) or [phone] ([facility])  GI Phone# [phone] ([facility])      
26F w/ a PMHx of PUD, drug use (heroin, marijuana, benzodiazipines) currently on methadone presents to the emergency room for nausea and vomitting

## 2022-04-21 NOTE — DISCHARGE NOTE NURSING/CASE MANAGEMENT/SOCIAL WORK - NSDCPEFALRISK_GEN_ALL_CORE
For information on Fall & Injury Prevention, visit: [email or web address] OR  [email or web address] OR  https://www.cdc.gov/steadi/patient.html

## 2022-04-21 NOTE — PROGRESS NOTE ADULT - SUBJECTIVE AND OBJECTIVE BOX
Patient is a 26y old  Female who presents with a chief complaint of     SUBJECTIVE / OVERNIGHT EVENTS: Patient seen and examined at bedside.    MEDICATIONS  (STANDING):  citalopram 40 milliGRAM(s) Oral daily  doxazosin 2 milliGRAM(s) Oral at bedtime  methadone    Tablet 85 milliGRAM(s) Oral daily  nicotine - 21 mG/24Hr(s) Patch 1 patch Transdermal daily  pantoprazole  Injectable 40 milliGRAM(s) IV Push two times a day  phenazopyridine 100 milliGRAM(s) Oral every 8 hours  QUEtiapine 50 milliGRAM(s) Oral at bedtime    MEDICATIONS  (PRN):  hydrOXYzine hydrochloride 50 milliGRAM(s) Oral at bedtime PRN Anxiety      Vital Signs Last 24 Hrs  T(C): 36.8 (2022 06:46), Max: 37.4 (2022 17:26)  T(F): 98.2 (2022 06:46), Max: 99.4 (2022 17:26)  HR: 74 (2022 06:46) (63 - 84)  BP: 101/60 (2022 06:46) (101/60 - 113/77)  BP(mean): --  RR: 18 (2022 06:46) (16 - 18)  SpO2: 100% (2022 06:46) (96% - 100%)  CAPILLARY BLOOD GLUCOSE        I&O's Summary      PHYSICAL EXAM:  GENERAL APPEARANCE: Well developed, NAD  HEENT:  PERRL, EOMI. hearing grossly intact.  NECK: Neck supple, non-tender no lymphadenopathy, masses or thyromegaly.  CARDIAC: Normal S1 and S2. no mrg. RRR  LUNGS: Clear to auscultation B/L, no rales, rhonchi, or wheezing  ABDOMEN: Soft, non-distended, guarding on LLQ palpation, suprapubic tenderness, epigastric tenderness  MUSCULOSKELETAL: ROM intact.  No joint erythema or tenderness.   EXTREMITIES: No edema. Peripheral pulses intact.   NEUROLOGICAL: Non focal. Strength and sensation symmetric and intact throughout.   SKIN: R wrist rash  PSYCHIATRIC: AOx3 , Normal mood and affect    LABS:                        12.3   4.98  )-----------( 215      ( 2022 06:59 )             38.5     04-21    137  |  104  |  9   ----------------------------<  84  4.0   |  21<L>  |  0.68    Ca    8.3<L>      2022 06:59  Phos  3.3     -  Mg     1.90         TPro  7.4  /  Alb  4.8  /  TBili  0.4  /  DBili  x   /  AST  27  /  ALT  16  /  AlkPhos  60            Urinalysis Basic - ( 2022 11:56 )    Color: Colorless / Appearance: Clear / S.014 / pH: x  Gluc: x / Ketone: Negative  / Bili: Negative / Urobili: <2 mg/dL   Blood: x / Protein: Negative / Nitrite: Negative   Leuk Esterase: Negative / RBC: x / WBC x   Sq Epi: x / Non Sq Epi: x / Bacteria: x        RADIOLOGY & ADDITIONAL TESTS:    Imaging Personally Reviewed:    Consultant(s) Notes Reviewed:      Care Discussed with Consultants/Other Providers:    Maria Teresa Gao, PGY-1; [facility] Pager: [phone]; [facility] Pager: [phone]

## 2022-05-16 ENCOUNTER — EMERGENCY (EMERGENCY)
Facility: HOSPITAL | Age: 27
LOS: 1 days | Discharge: HOME | End: 2022-05-16
Attending: EMERGENCY MEDICINE | Admitting: EMERGENCY MEDICINE
Payer: MEDICAID

## 2022-05-16 VITALS
SYSTOLIC BLOOD PRESSURE: 97 MMHG | HEIGHT: 62 IN | TEMPERATURE: 98 F | DIASTOLIC BLOOD PRESSURE: 80 MMHG | RESPIRATION RATE: 18 BRPM | OXYGEN SATURATION: 100 % | HEART RATE: 77 BPM

## 2022-05-16 VITALS
RESPIRATION RATE: 16 BRPM | SYSTOLIC BLOOD PRESSURE: 116 MMHG | OXYGEN SATURATION: 100 % | TEMPERATURE: 99 F | DIASTOLIC BLOOD PRESSURE: 76 MMHG | HEART RATE: 93 BPM

## 2022-05-16 DIAGNOSIS — Z34.90 ENCOUNTER FOR SUPERVISION OF NORMAL PREGNANCY, UNSPECIFIED, UNSPECIFIED TRIMESTER: ICD-10-CM

## 2022-05-16 PROBLEM — K27.9 PEPTIC ULCER, SITE UNSPECIFIED, UNSPECIFIED AS ACUTE OR CHRONIC, WITHOUT HEMORRHAGE OR PERFORATION: Chronic | Status: ACTIVE | Noted: 2022-04-20

## 2022-05-16 LAB
ALBUMIN SERPL ELPH-MCNC: 4.4 G/DL — SIGNIFICANT CHANGE UP (ref 3.3–5)
ALP SERPL-CCNC: 46 U/L — SIGNIFICANT CHANGE UP (ref 40–120)
ALT FLD-CCNC: 12 U/L — SIGNIFICANT CHANGE UP (ref 4–33)
ANION GAP SERPL CALC-SCNC: 16 MMOL/L — HIGH (ref 7–14)
APPEARANCE UR: CLEAR — SIGNIFICANT CHANGE UP
AST SERPL-CCNC: 23 U/L — SIGNIFICANT CHANGE UP (ref 4–32)
BASOPHILS # BLD AUTO: 0.02 K/UL — SIGNIFICANT CHANGE UP (ref 0–0.2)
BASOPHILS NFR BLD AUTO: 0.2 % — SIGNIFICANT CHANGE UP (ref 0–2)
BILIRUB SERPL-MCNC: 0.5 MG/DL — SIGNIFICANT CHANGE UP (ref 0.2–1.2)
BILIRUB UR-MCNC: NEGATIVE — SIGNIFICANT CHANGE UP
BLD GP AB SCN SERPL QL: NEGATIVE — SIGNIFICANT CHANGE UP
BUN SERPL-MCNC: 11 MG/DL — SIGNIFICANT CHANGE UP (ref 7–23)
CALCIUM SERPL-MCNC: 9 MG/DL — SIGNIFICANT CHANGE UP (ref 8.4–10.5)
CHLORIDE SERPL-SCNC: 102 MMOL/L — SIGNIFICANT CHANGE UP (ref 98–107)
CO2 SERPL-SCNC: 17 MMOL/L — LOW (ref 22–31)
COLOR SPEC: YELLOW — SIGNIFICANT CHANGE UP
CREAT SERPL-MCNC: 0.49 MG/DL — LOW (ref 0.5–1.3)
DIFF PNL FLD: NEGATIVE — SIGNIFICANT CHANGE UP
EGFR: 133 ML/MIN/1.73M2 — SIGNIFICANT CHANGE UP
EOSINOPHIL # BLD AUTO: 0 K/UL — SIGNIFICANT CHANGE UP (ref 0–0.5)
EOSINOPHIL NFR BLD AUTO: 0 % — SIGNIFICANT CHANGE UP (ref 0–6)
GLUCOSE SERPL-MCNC: 88 MG/DL — SIGNIFICANT CHANGE UP (ref 70–99)
GLUCOSE UR QL: NEGATIVE — SIGNIFICANT CHANGE UP
HCG SERPL-ACNC: SIGNIFICANT CHANGE UP MIU/ML
HCT VFR BLD CALC: 36.3 % — SIGNIFICANT CHANGE UP (ref 34.5–45)
HGB BLD-MCNC: 12.1 G/DL — SIGNIFICANT CHANGE UP (ref 11.5–15.5)
IANC: 10.3 K/UL — HIGH (ref 1.8–7.4)
IMM GRANULOCYTES NFR BLD AUTO: 0.3 % — SIGNIFICANT CHANGE UP (ref 0–1.5)
KETONES UR-MCNC: ABNORMAL
LEUKOCYTE ESTERASE UR-ACNC: ABNORMAL
LYMPHOCYTES # BLD AUTO: 0.9 K/UL — LOW (ref 1–3.3)
LYMPHOCYTES # BLD AUTO: 7.6 % — LOW (ref 13–44)
MCHC RBC-ENTMCNC: 31.6 PG — SIGNIFICANT CHANGE UP (ref 27–34)
MCHC RBC-ENTMCNC: 33.3 GM/DL — SIGNIFICANT CHANGE UP (ref 32–36)
MCV RBC AUTO: 94.8 FL — SIGNIFICANT CHANGE UP (ref 80–100)
MONOCYTES # BLD AUTO: 0.56 K/UL — SIGNIFICANT CHANGE UP (ref 0–0.9)
MONOCYTES NFR BLD AUTO: 4.7 % — SIGNIFICANT CHANGE UP (ref 2–14)
NEUTROPHILS # BLD AUTO: 10.3 K/UL — HIGH (ref 1.8–7.4)
NEUTROPHILS NFR BLD AUTO: 87.2 % — HIGH (ref 43–77)
NITRITE UR-MCNC: NEGATIVE — SIGNIFICANT CHANGE UP
NRBC # BLD: 0 /100 WBCS — SIGNIFICANT CHANGE UP
NRBC # FLD: 0 K/UL — SIGNIFICANT CHANGE UP
PH UR: 7.5 — SIGNIFICANT CHANGE UP (ref 5–8)
PLATELET # BLD AUTO: 327 K/UL — SIGNIFICANT CHANGE UP (ref 150–400)
POTASSIUM SERPL-MCNC: 4.4 MMOL/L — SIGNIFICANT CHANGE UP (ref 3.5–5.3)
POTASSIUM SERPL-SCNC: 4.4 MMOL/L — SIGNIFICANT CHANGE UP (ref 3.5–5.3)
PROT SERPL-MCNC: 7.1 G/DL — SIGNIFICANT CHANGE UP (ref 6–8.3)
PROT UR-MCNC: ABNORMAL
RBC # BLD: 3.83 M/UL — SIGNIFICANT CHANGE UP (ref 3.8–5.2)
RBC # FLD: 15.5 % — HIGH (ref 10.3–14.5)
RH IG SCN BLD-IMP: POSITIVE — SIGNIFICANT CHANGE UP
SODIUM SERPL-SCNC: 135 MMOL/L — SIGNIFICANT CHANGE UP (ref 135–145)
SP GR SPEC: 1.03 — SIGNIFICANT CHANGE UP (ref 1–1.05)
UROBILINOGEN FLD QL: SIGNIFICANT CHANGE UP
WBC # BLD: 11.82 K/UL — HIGH (ref 3.8–10.5)
WBC # FLD AUTO: 11.82 K/UL — HIGH (ref 3.8–10.5)

## 2022-05-16 PROCEDURE — 99285 EMERGENCY DEPT VISIT HI MDM: CPT

## 2022-05-16 PROCEDURE — 76817 TRANSVAGINAL US OBSTETRIC: CPT | Mod: 26

## 2022-05-16 RX ORDER — DOXYLAMINE SUCCINATE AND PYRIDOXINE HYDROCHLORIDE, DELAYED RELEASE TABLETS 10 MG/10 MG 10; 10 MG/1; MG/1
2 TABLET, DELAYED RELEASE ORAL
Qty: 18 | Refills: 0
Start: 2022-05-16 | End: 2022-05-18

## 2022-05-16 RX ORDER — METHADONE HYDROCHLORIDE 40 MG/1
95 TABLET ORAL ONCE
Refills: 0 | Status: DISCONTINUED | OUTPATIENT
Start: 2022-05-16 | End: 2022-05-16

## 2022-05-16 RX ORDER — SODIUM CHLORIDE 9 MG/ML
1000 INJECTION, SOLUTION INTRAVENOUS
Refills: 0 | Status: DISCONTINUED | OUTPATIENT
Start: 2022-05-16 | End: 2022-05-20

## 2022-05-16 RX ORDER — METHADONE HYDROCHLORIDE 40 MG/1
85 TABLET ORAL ONCE
Refills: 0 | Status: DISCONTINUED | OUTPATIENT
Start: 2022-05-16 | End: 2022-05-16

## 2022-05-16 RX ORDER — CEFPODOXIME PROXETIL 100 MG
100 TABLET ORAL ONCE
Refills: 0 | Status: COMPLETED | OUTPATIENT
Start: 2022-05-16 | End: 2022-05-16

## 2022-05-16 RX ORDER — CEFPODOXIME PROXETIL 100 MG
1 TABLET ORAL
Qty: 14 | Refills: 0
Start: 2022-05-16 | End: 2022-05-22

## 2022-05-16 RX ADMIN — SODIUM CHLORIDE 75 MILLILITER(S): 9 INJECTION, SOLUTION INTRAVENOUS at 20:34

## 2022-05-16 RX ADMIN — Medication 100 MILLIGRAM(S): at 21:38

## 2022-05-16 RX ADMIN — METHADONE HYDROCHLORIDE 95 MILLIGRAM(S): 40 TABLET ORAL at 19:44

## 2022-05-16 NOTE — ED PROVIDER NOTE - NSFOLLOWUPCLINICS_GEN_ALL_ED_FT
An OB/GYN physician  Obstetrics & Gynecology  .  [place]   Phone:   Fax:     [facility]  OB/GYN & Surg  [address] [place] [id number]  Phone: [phone]  Fax:      - Primary Care  Primary Care  [address] [place] [address]  Phone: [phone]  Fax:     [facility] Gynecology and Obstetrics  Gynceology/OB  [address] [place] [address]  Phone: [phone]  Fax:

## 2022-05-16 NOTE — ED PROVIDER NOTE - PATIENT PORTAL LINK FT
You can access the FollowMyHealth Patient Portal offered by [facility] by registering at the following website: [email or web address]. By joining [employer]’s FollowMyHealth portal, you will also be able to view your health information using other applications (apps) compatible with our system.

## 2022-05-16 NOTE — ED ADULT TRIAGE NOTE - CHIEF COMPLAINT QUOTE
pt reports 2 week pregnancy positive at home test. LMP 04/10. pt states she missed her appointment at the methadone clinic this morning. pt c/o sob, tremors to b/l hands, n/v, diaphoresis. pt denies chest pain, diarrhea fevers, chills, cough. PMH heroine use (last in nov). tremors noted to b/l hands.

## 2022-05-16 NOTE — ED PROVIDER NOTE - OBJECTIVE STATEMENT
Pernell: Missed methadone clinic dose (95 mg, from [facility] methadone clinic); had used heroin in the past. Last dose yest. Had home preg. test 2 days ago.  LMP 4/10/22 (never preg. before). No VB. Has central low abd pain at night and in AM, sweating, vomiting. Has F (~101). No h/o abd surgery. No  Sx. On Celexa. Smokes tobacco. No ETOH. PCP = Dr. Newman in [place]. Has [facility] appt. No h/o STDs. No PNA/URI Sx. No recent IVDA.

## 2022-05-16 NOTE — ED ADULT NURSE NOTE - OBJECTIVE STATEMENT
Pt received to 5a , awake and alert, A&OX4, ambulatory. States she has had a positive pregnancy test 2 days ago. C/o  mid lower abd pain, nausea, and vomiting. Denies blood or bilious vomit. Last vomting this AM. Methadone clinic patient, last dose yesterday. Denies vaginal bleeding or discharge. First pregnancy.  Respirations even and unlabored. Resting comfortably. Denies CP, SOB, N/V, HA, dizziness, palpitations, fatigue. NSR on CM. Denies seizure hx.

## 2022-05-16 NOTE — ED PROVIDER NOTE - NSFOLLOWUPINSTRUCTIONS_ED_ALL_ED_FT
Rest and stay well hydrated.    Follow-up with your Ob/Gyn tomorrow as scheduled.     Take diclegis as directed for nausea/vomiting - see medication warnings.     Take antibiotics as directed for UTI - see medication warnings.     Return to the ED for any worsening pain, vaginal bleeding, fevers, chills, vomiting or any new concerning symptoms.

## 2022-05-16 NOTE — ED PROVIDER NOTE - CLINICAL SUMMARY MEDICAL DECISION MAKING FREE TEXT BOX
Pernell: Check for ectopic: TVUS, HCG. Eval for F (UA, CBC, BCx (no recent IVDA to suggest risk for endocarditis). Give methadone dose.

## 2022-05-16 NOTE — ED PROVIDER NOTE - PROGRESS NOTE DETAILS
Ford, PGY3 - discussed results w/ pt, +feels improved +tolerating PO in the ED. rx for UTI and morning sckness sent. pt states she has f/u appt w/ gyn tomorrow. return precautions discussed. pt agrees with plan all questions answered

## 2022-05-16 NOTE — ED PROVIDER NOTE - ATTENDING CONTRIBUTION TO CARE
I performed a face-to-face evaluation of the patient and performed a history and physical examination. I agree with the history and physical examination. If this was a PA visit, I personally saw the patient with the PA and performed a substantive portion of the visit including all aspects of the medical decision making.    Check for ectopic: TVUS, HCG. Eval for F (UA, CBC, BCx (no recent IVDA to suggest risk for endocarditis). Give methadone dose.

## 2022-05-16 NOTE — ED PROVIDER NOTE - PHYSICAL EXAMINATION
Well appearing, well nourished, awake, alert, oriented to person, place, time/situation and in no apparent distress.    Airway patent    Eyes without scleral injection. No jaundice.    Strong pulse.    Respirations unlabored.    Abdomen soft, lower central and L side tender, no guarding. Has rebound.    Spine appears normal, range of motion is not limited, no muscle or joint tenderness.    Alert and oriented, no gross motor or sensory deficits.    Skin normal color for race, warm, dry and intact. No evidence of rash.    No SI/HI.

## 2022-05-16 NOTE — ED PROVIDER NOTE - NSFOLLOWUPCLINICSTOKEN_GEN_ALL_ED_FT
[id number]: || ||00\01||False;[id number]: || ||00\01||False;[id number]: || ||00\01||False;[id number]: || ||00\01||False;

## 2022-05-17 LAB
CULTURE RESULTS: SIGNIFICANT CHANGE UP
SARS-COV-2 RNA SPEC QL NAA+PROBE: SIGNIFICANT CHANGE UP
SPECIMEN SOURCE: SIGNIFICANT CHANGE UP

## 2022-05-21 LAB
CULTURE RESULTS: SIGNIFICANT CHANGE UP
SPECIMEN SOURCE: SIGNIFICANT CHANGE UP

## 2022-05-25 ENCOUNTER — APPOINTMENT (OUTPATIENT)
Dept: ULTRASOUND IMAGING | Facility: CLINIC | Age: 27
End: 2022-05-25

## 2022-05-29 NOTE — DISCHARGE NOTE NURSING/CASE MANAGEMENT/SOCIAL WORK - NSDCFUADDAPPT_GEN_ALL_CORE_FT
[facility] Medicine  Progress Note    Patient Name: Odette Hart  MRN: [id number]  Patient Class: IP- Inpatient   Admission Date: 5/23/2022  Length of Stay: 4 days  Attending Physician: Sanjuanita Briones MD  Primary Care Provider: Braxton Barragan MD        Subjective:     Principal Problem:Acute on chronic systolic heart failure        HPI:  The patient is a 65 y.o. female with a past medical history of HTN, HLD, CAD, CHF with EF of 20%, MI and ICD who presents with complaint of shortness of breath x 2 weeks. Patient explains that she had an appendectomy, colostomy, cecectomy, and cholecystectomy on 05/09. Since the surgery, she has been recovering in a rehab facility. She states that she has been experiencing shortness of breath on exertion since the surgery. No fever or cough. She reports nausea but no vomiting.  On initial exam, the patient with acute dyspnea with any exertion.  BNP greater than 3000 with peripheral edema.  CXR not very impressive for pulmonary edema.  She will be admitted for further management of her acute on chronic systolic heart failure and Cardiology consult.      Overview/Hospital Course:  Continued on diuresis with improvement in breathing and swelling.Weaned off o2.      Interval History: improved  sob and swelling in legs,able to eat small amounts at times, more stool in bag, weaned to room air, c/o urine darker in colour and some burning.    Review of Systems   Constitutional:  Negative for chills and fever.   HENT:  Negative for trouble swallowing.    Respiratory:  Negative for cough and shortness of breath.    Cardiovascular:  Negative for chest pain and leg swelling.   Gastrointestinal:  Positive for abdominal pain. Negative for blood in stool, nausea and vomiting.   Genitourinary:  Negative for dysuria and hematuria.   Skin:  Negative for rash.   Neurological:  Positive for weakness. Negative for headaches.   Psychiatric/Behavioral:  Negative for  confusion.    Objective:     Vital Signs (Most Recent):  Temp: 98.6 °F (37 °C) (05/29/22 1155)  Pulse: 92 (05/29/22 1155)  Resp: 18 (05/29/22 1155)  BP: (S) (!) 88/53 (05/29/22 1155)  SpO2: 99 % (05/29/22 1155)   Vital Signs (24h Range):  Temp:  [97.8 °F (36.6 °C)-98.6 °F (37 °C)] 98.6 °F (37 °C)  Pulse:  [] 92  Resp:  [17-19] 18  SpO2:  [95 %-100 %] 99 %  BP: ()/(50-59) 88/53     Weight: 53.9 kg (118 lb 13.3 oz)  Body mass index is 21.05 kg/m².    Intake/Output Summary (Last 24 hours) at 5/29/2022 1439  Last data filed at 5/29/2022 1227  Gross per 24 hour   Intake 230 ml   Output 800 ml   Net -570 ml        Physical Exam  Vitals reviewed.   Constitutional:       General: She is not in acute distress.     Appearance: She is well-developed. She is ill-appearing.   HENT:      Head: Normocephalic and atraumatic.   Eyes:      Extraocular Movements: Extraocular movements intact.      Pupils: Pupils are equal, round, and reactive to light.   Cardiovascular:      Rate and Rhythm: Normal rate and regular rhythm.   Pulmonary:      Effort: Pulmonary effort is normal. No respiratory distress.      Comments: Decreased breath sounds at bases.  Abdominal:      General: Bowel sounds are normal. There is no distension.      Palpations: Abdomen is soft.      Tenderness: There is no abdominal tenderness.      Comments: Midline incision, ostomy with small amount of semi solid stool   Musculoskeletal:         General: No swelling. Normal range of motion.      Cervical back: Normal range of motion and neck supple.      Comments: swelling in both feet much improved   Skin:     General: Skin is warm.   Neurological:      General: No focal deficit present.      Mental Status: She is alert and oriented to person, place, and time.   Psychiatric:         Mood and Affect: Mood normal.         Behavior: Behavior normal.       Significant Labs: All pertinent labs within the past 24 hours have been reviewed.    Significant Imaging: I  have reviewed all pertinent imaging results/findings within the past 24 hours.      Assessment/Plan:      * Acute on chronic systolic heart failure  CXR-  Stable findings of cardiomegaly with mild pulmonary vascular congestion.   BNP- 3340    Consult Cardiology, following  Lasix decreased to 40 mg oral with improved volume status  Also started aldactone 25 mg daily  dvt negative  Improved fluid status      Results for orders placed during the hospital encounter of 05/23/22    Echo    Interpretation Summary  · The left ventricle is severely enlarged with eccentric hypertrophy and severely decreased systolic function.  · Severe left atrial enlargement.  · Grade III left ventricular diastolic dysfunction.  · Normal right ventricular size with normal right ventricular systolic function.  · Mild-to-moderate mitral regurgitation.  · Mild tricuspid regurgitation.  · There is mild pulmonary hypertension.      Chronic hypoxemic respiratory failure  Patient has been on o2 since her dc from last sx  Currently saturating well on o2, wean off as tolerated  On room air today        CAD (coronary artery disease)  s/p stent in 01/22  On asa and effient, per last cards note till 5/1/22  resumed      Hypomagnesemia  Replaced iv  Improved  Replace 5/29      Debility  Therapy eval  Skilled suggested, does not want to go back to same facility  Wants to go to [facility]     Anemia  Appears stable, will monitor      Colovesical fistula   On 5/9/22 she underwent cystoscopy with BL ureteral catheters, open sigmoid colon resection, appendectomy, and creation of end colostomy.  Diet as tolerated  Added miralax      Familial hypercholesterolemia  Continue Zetia        VTE Risk Mitigation (From admission, onward)         Ordered     enoxaparin injection 40 mg  Daily         05/26/22 2509     IP VTE HIGH RISK PATIENT  Once         05/24/22 0009     Place sequential compression device  Until discontinued         05/24/22 0009                 Discharge Planning   SIVAN:      Code Status: Full Code   Is the patient medically ready for discharge?:     Reason for patient still in hospital (select all that apply): Patient trending condition and Treatment  Discharge Plan A: Skilled Nursing Facility   Discharge Delays: None known at this time              Sanjuanita Briones MD  Department of Hospital Medicine   [facility] - [facility] ([facility])   [facility] on [facility] Information:    -Walk-in hours: Monday to Friday, 9am to 3pm   -Almost all walk-in patients will be able to see a psych prescriber the same day   -Scheduled, non-urgent, evening remote/virtual appointments are available on a limited basis. Call our  to inquire about these: [phone]. A crisis center clinician screens these requests in the late afternoon and if appropriate it takes a few days to set-up.   -Visits take about 2 to 4 hours total   -Mornings are the best time for patients to arrive    For Telehealth options try:  [employer]: [email or web address] (to access psychiatrist or therapist)  [facility]: "[email or web address] (to access psychiatrist or therapist)  [facility]: [email or web address] (Largest online therapy group)

## 2022-06-09 ENCOUNTER — APPOINTMENT (OUTPATIENT)
Dept: OBGYN | Facility: HOSPITAL | Age: 27
End: 2022-06-09

## 2022-06-09 ENCOUNTER — APPOINTMENT (OUTPATIENT)
Dept: ULTRASOUND IMAGING | Facility: CLINIC | Age: 27
End: 2022-06-09
Payer: MEDICAID

## 2022-06-09 PROCEDURE — 76536 US EXAM OF HEAD AND NECK: CPT

## 2022-06-09 PROCEDURE — 76700 US EXAM ABDOM COMPLETE: CPT

## 2022-11-21 ENCOUNTER — NON-APPOINTMENT (OUTPATIENT)
Age: 27
End: 2022-11-21

## 2023-07-25 ENCOUNTER — APPOINTMENT (OUTPATIENT)
Dept: ULTRASOUND IMAGING | Facility: CLINIC | Age: 28
End: 2023-07-25
Payer: MEDICAID

## 2023-07-25 PROCEDURE — 76536 US EXAM OF HEAD AND NECK: CPT

## 2023-09-13 ENCOUNTER — APPOINTMENT (OUTPATIENT)
Dept: ENDOCRINOLOGY | Facility: CLINIC | Age: 28
End: 2023-09-13

## 2023-10-08 ENCOUNTER — NON-APPOINTMENT (OUTPATIENT)
Age: 28
End: 2023-10-08

## 2024-03-29 ENCOUNTER — NON-APPOINTMENT (OUTPATIENT)
Age: 29
End: 2024-03-29

## 2024-09-21 ENCOUNTER — APPOINTMENT (OUTPATIENT)
Dept: MRI IMAGING | Facility: CLINIC | Age: 29
End: 2024-09-21

## 2024-09-21 PROCEDURE — 70553 MRI BRAIN STEM W/O & W/DYE: CPT

## 2024-09-21 PROCEDURE — 70546 MR ANGIOGRAPH HEAD W/O&W/DYE: CPT | Mod: 59

## 2024-09-21 PROCEDURE — A9585: CPT | Mod: JW

## 2024-09-23 ENCOUNTER — APPOINTMENT (OUTPATIENT)
Dept: ULTRASOUND IMAGING | Facility: CLINIC | Age: 29
End: 2024-09-23
Payer: COMMERCIAL

## 2024-09-23 PROCEDURE — 76536 US EXAM OF HEAD AND NECK: CPT

## 2025-03-27 ENCOUNTER — APPOINTMENT (OUTPATIENT)
Dept: ENDOCRINOLOGY | Facility: CLINIC | Age: 30
End: 2025-03-27
Payer: COMMERCIAL

## 2025-03-27 VITALS
DIASTOLIC BLOOD PRESSURE: 64 MMHG | SYSTOLIC BLOOD PRESSURE: 116 MMHG | HEART RATE: 84 BPM | OXYGEN SATURATION: 96 % | WEIGHT: 120 LBS

## 2025-03-27 DIAGNOSIS — E04.2 NONTOXIC MULTINODULAR GOITER: ICD-10-CM

## 2025-03-27 DIAGNOSIS — E05.90 THYROTOXICOSIS, UNSPECIFIED W/OUT THYROTOXIC CRISIS OR STORM: ICD-10-CM

## 2025-03-27 PROCEDURE — 99204 OFFICE O/P NEW MOD 45 MIN: CPT

## 2025-03-27 PROCEDURE — G2211 COMPLEX E/M VISIT ADD ON: CPT | Mod: NC

## 2025-07-20 ENCOUNTER — OUTPATIENT (OUTPATIENT)
Dept: OUTPATIENT SERVICES | Facility: HOSPITAL | Age: 30
LOS: 1 days | End: 2025-07-20
Payer: COMMERCIAL

## 2025-07-20 DIAGNOSIS — Z00.00 ENCOUNTER FOR GENERAL ADULT MEDICAL EXAMINATION WITHOUT ABNORMAL FINDINGS: ICD-10-CM

## 2025-07-20 PROCEDURE — 72141 MRI NECK SPINE W/O DYE: CPT

## 2025-07-20 PROCEDURE — 73221 MRI JOINT UPR EXTREM W/O DYE: CPT

## 2025-07-20 PROCEDURE — 72148 MRI LUMBAR SPINE W/O DYE: CPT

## 2025-09-08 ENCOUNTER — APPOINTMENT (OUTPATIENT)
Dept: MRI IMAGING | Facility: CLINIC | Age: 30
End: 2025-09-08

## 2025-09-08 PROCEDURE — 71550 MRI CHEST W/O DYE: CPT | Mod: 26

## 2025-09-08 PROCEDURE — 73721 MRI JNT OF LWR EXTRE W/O DYE: CPT | Mod: 26,RT
